# Patient Record
Sex: FEMALE | Race: WHITE | Employment: OTHER | URBAN - METROPOLITAN AREA
[De-identification: names, ages, dates, MRNs, and addresses within clinical notes are randomized per-mention and may not be internally consistent; named-entity substitution may affect disease eponyms.]

---

## 2017-01-11 ENCOUNTER — CARE COORDINATION (OUTPATIENT)
Dept: CASE MANAGEMENT | Age: 72
End: 2017-01-11

## 2017-01-12 ENCOUNTER — HOSPITAL ENCOUNTER (EMERGENCY)
Age: 72
Discharge: HOME | End: 2017-01-12
Payer: MEDICARE

## 2017-01-12 ENCOUNTER — APPOINTMENT (OUTPATIENT)
Dept: CT IMAGING | Age: 72
End: 2017-01-12
Payer: MEDICARE

## 2017-01-12 VITALS
DIASTOLIC BLOOD PRESSURE: 69 MMHG | SYSTOLIC BLOOD PRESSURE: 161 MMHG | BODY MASS INDEX: 29.95 KG/M2 | OXYGEN SATURATION: 98 % | WEIGHT: 180 LBS | TEMPERATURE: 98.5 F | HEART RATE: 66 BPM | RESPIRATION RATE: 15 BRPM

## 2017-01-12 DIAGNOSIS — H10.9 CONJUNCTIVITIS OF BOTH EYES, UNSPECIFIED CONJUNCTIVITIS TYPE: ICD-10-CM

## 2017-01-12 DIAGNOSIS — K52.9 COLITIS: Primary | ICD-10-CM

## 2017-01-12 DIAGNOSIS — K64.9 HEMORRHOIDS, UNSPECIFIED HEMORRHOID TYPE: ICD-10-CM

## 2017-01-12 LAB
ALBUMIN SERPL-MCNC: 3.8 G/DL (ref 3.9–4.9)
ALP BLD-CCNC: 117 U/L (ref 40–130)
ALT SERPL-CCNC: <5 U/L (ref 0–33)
ANION GAP SERPL CALCULATED.3IONS-SCNC: 8 MEQ/L (ref 7–13)
AST SERPL-CCNC: 12 U/L (ref 0–35)
BASOPHILS ABSOLUTE: 0.1 K/UL (ref 0–0.2)
BASOPHILS RELATIVE PERCENT: 0.7 %
BILIRUB SERPL-MCNC: 0.3 MG/DL (ref 0–1.2)
BILIRUBIN URINE: NEGATIVE
BLOOD, URINE: NEGATIVE
BUN BLDV-MCNC: 11 MG/DL (ref 8–23)
CALCIUM SERPL-MCNC: 9 MG/DL (ref 8.6–10.2)
CHLORIDE BLD-SCNC: 99 MEQ/L (ref 98–107)
CLARITY: CLEAR
CO2: 30 MEQ/L (ref 22–29)
COLOR: YELLOW
CREAT SERPL-MCNC: 0.92 MG/DL (ref 0.5–0.9)
EOSINOPHILS ABSOLUTE: 0.3 K/UL (ref 0–0.7)
EOSINOPHILS RELATIVE PERCENT: 2.2 %
GFR AFRICAN AMERICAN: >60
GFR NON-AFRICAN AMERICAN: >60
GLOBULIN: 2.5 G/DL (ref 2.3–3.5)
GLUCOSE BLD-MCNC: 101 MG/DL (ref 74–109)
GLUCOSE URINE: NEGATIVE MG/DL
HCT VFR BLD CALC: 36.7 % (ref 37–47)
HEMOGLOBIN: 12.3 G/DL (ref 12–16)
KETONES, URINE: NEGATIVE MG/DL
LEUKOCYTE ESTERASE, URINE: NEGATIVE
LIPASE: 25 U/L (ref 13–60)
LYMPHOCYTES ABSOLUTE: 1.5 K/UL (ref 1–4.8)
LYMPHOCYTES RELATIVE PERCENT: 11.8 %
MCH RBC QN AUTO: 32.2 PG (ref 27–31.3)
MCHC RBC AUTO-ENTMCNC: 33.5 % (ref 33–37)
MCV RBC AUTO: 96.2 FL (ref 82–100)
MONOCYTES ABSOLUTE: 1 K/UL (ref 0.2–0.8)
MONOCYTES RELATIVE PERCENT: 8.3 %
NEUTROPHILS ABSOLUTE: 9.6 K/UL (ref 1.4–6.5)
NEUTROPHILS RELATIVE PERCENT: 77 %
NITRITE, URINE: NEGATIVE
PDW BLD-RTO: 13.7 % (ref 11.5–14.5)
PH UA: 7 (ref 5–9)
PLATELET # BLD: 333 K/UL (ref 130–400)
POC CREATININE WHOLE BLOOD: 0.9
POTASSIUM SERPL-SCNC: 4.6 MEQ/L (ref 3.5–5.1)
PROTEIN UA: NEGATIVE MG/DL
RBC # BLD: 3.81 M/UL (ref 4.2–5.4)
SODIUM BLD-SCNC: 137 MEQ/L (ref 132–144)
SPECIFIC GRAVITY UA: 1.01 (ref 1–1.03)
TOTAL PROTEIN: 6.3 G/DL (ref 6.4–8.1)
UROBILINOGEN, URINE: 0.2 E.U./DL
WBC # BLD: 12.5 K/UL (ref 4.8–10.8)

## 2017-01-12 PROCEDURE — 99283 EMERGENCY DEPT VISIT LOW MDM: CPT

## 2017-01-12 PROCEDURE — 6360000004 HC RX CONTRAST MEDICATION: Performed by: RADIOLOGY

## 2017-01-12 PROCEDURE — 83690 ASSAY OF LIPASE: CPT

## 2017-01-12 PROCEDURE — 80053 COMPREHEN METABOLIC PANEL: CPT

## 2017-01-12 PROCEDURE — 85025 COMPLETE CBC W/AUTO DIFF WBC: CPT

## 2017-01-12 PROCEDURE — 36415 COLL VENOUS BLD VENIPUNCTURE: CPT

## 2017-01-12 PROCEDURE — 74177 CT ABD & PELVIS W/CONTRAST: CPT

## 2017-01-12 PROCEDURE — 6370000000 HC RX 637 (ALT 250 FOR IP): Performed by: NURSE PRACTITIONER

## 2017-01-12 PROCEDURE — 81003 URINALYSIS AUTO W/O SCOPE: CPT

## 2017-01-12 RX ORDER — IBUPROFEN 800 MG/1
800 TABLET ORAL EVERY 6 HOURS PRN
Qty: 20 TABLET | Refills: 0 | Status: ON HOLD | OUTPATIENT
Start: 2017-01-12 | End: 2018-10-30 | Stop reason: HOSPADM

## 2017-01-12 RX ORDER — ONDANSETRON 2 MG/ML
4 INJECTION INTRAMUSCULAR; INTRAVENOUS ONCE
Status: DISCONTINUED | OUTPATIENT
Start: 2017-01-12 | End: 2017-01-12 | Stop reason: HOSPADM

## 2017-01-12 RX ORDER — 0.9 % SODIUM CHLORIDE 0.9 %
500 INTRAVENOUS SOLUTION INTRAVENOUS ONCE
Status: DISCONTINUED | OUTPATIENT
Start: 2017-01-12 | End: 2017-01-12 | Stop reason: HOSPADM

## 2017-01-12 RX ORDER — TRAMADOL HYDROCHLORIDE 50 MG/1
50 TABLET ORAL EVERY 4 HOURS PRN
Qty: 10 TABLET | Refills: 0 | Status: SHIPPED | OUTPATIENT
Start: 2017-01-12 | End: 2017-01-22

## 2017-01-12 RX ORDER — IBUPROFEN 800 MG/1
800 TABLET ORAL EVERY 6 HOURS PRN
Qty: 20 TABLET | Refills: 0 | Status: SHIPPED | OUTPATIENT
Start: 2017-01-12 | End: 2017-01-12

## 2017-01-12 RX ORDER — CIPROFLOXACIN 500 MG/1
500 TABLET, FILM COATED ORAL ONCE
Status: COMPLETED | OUTPATIENT
Start: 2017-01-12 | End: 2017-01-12

## 2017-01-12 RX ORDER — METRONIDAZOLE 500 MG/1
500 TABLET ORAL ONCE
Status: COMPLETED | OUTPATIENT
Start: 2017-01-12 | End: 2017-01-12

## 2017-01-12 RX ORDER — CIPROFLOXACIN 500 MG/1
500 TABLET, FILM COATED ORAL 2 TIMES DAILY
Qty: 20 TABLET | Refills: 0 | Status: SHIPPED | OUTPATIENT
Start: 2017-01-12 | End: 2017-01-12

## 2017-01-12 RX ORDER — MORPHINE SULFATE 4 MG/ML
4 INJECTION, SOLUTION INTRAMUSCULAR; INTRAVENOUS ONCE
Status: DISCONTINUED | OUTPATIENT
Start: 2017-01-12 | End: 2017-01-12 | Stop reason: HOSPADM

## 2017-01-12 RX ORDER — ERYTHROMYCIN 5 MG/G
OINTMENT OPHTHALMIC ONCE
Qty: 1 TUBE | Refills: 0 | Status: SHIPPED | OUTPATIENT
Start: 2017-01-12 | End: 2017-01-12

## 2017-01-12 RX ORDER — METRONIDAZOLE 500 MG/1
500 TABLET ORAL 3 TIMES DAILY
Qty: 30 TABLET | Refills: 0 | Status: SHIPPED | OUTPATIENT
Start: 2017-01-12 | End: 2017-01-12

## 2017-01-12 RX ORDER — METRONIDAZOLE 500 MG/1
500 TABLET ORAL 3 TIMES DAILY
Qty: 30 TABLET | Refills: 0 | Status: SHIPPED | OUTPATIENT
Start: 2017-01-12 | End: 2017-01-22

## 2017-01-12 RX ORDER — ERYTHROMYCIN 5 MG/G
1 OINTMENT OPHTHALMIC ONCE
Status: COMPLETED | OUTPATIENT
Start: 2017-01-12 | End: 2017-01-12

## 2017-01-12 RX ORDER — ERYTHROMYCIN 5 MG/G
OINTMENT OPHTHALMIC EVERY 6 HOURS
Qty: 1 TUBE | Refills: 0 | Status: SHIPPED | OUTPATIENT
Start: 2017-01-12 | End: 2017-01-12

## 2017-01-12 RX ORDER — CIPROFLOXACIN 500 MG/1
500 TABLET, FILM COATED ORAL 2 TIMES DAILY
Qty: 20 TABLET | Refills: 0 | Status: SHIPPED | OUTPATIENT
Start: 2017-01-12 | End: 2017-01-22

## 2017-01-12 RX ADMIN — CIPROFLOXACIN 500 MG: 500 TABLET, FILM COATED ORAL at 16:57

## 2017-01-12 RX ADMIN — IOPAMIDOL 100 ML: 612 INJECTION, SOLUTION INTRAVENOUS at 13:25

## 2017-01-12 RX ADMIN — ERYTHROMYCIN 1 CM: 5 OINTMENT OPHTHALMIC at 16:58

## 2017-01-12 RX ADMIN — METRONIDAZOLE 500 MG: 500 TABLET ORAL at 16:57

## 2017-01-12 ASSESSMENT — ENCOUNTER SYMPTOMS
ABDOMINAL PAIN: 0
COLOR CHANGE: 0
SHORTNESS OF BREATH: 0
CHEST TIGHTNESS: 0
RECTAL PAIN: 1
ANAL BLEEDING: 0
COUGH: 0
NAUSEA: 0
BACK PAIN: 0
DIARRHEA: 0
VOMITING: 0

## 2017-01-12 ASSESSMENT — PAIN SCALES - GENERAL: PAINLEVEL_OUTOF10: 10

## 2017-01-19 ENCOUNTER — CARE COORDINATION (OUTPATIENT)
Dept: CASE MANAGEMENT | Age: 72
End: 2017-01-19

## 2017-04-07 ENCOUNTER — HOSPITAL ENCOUNTER (EMERGENCY)
Age: 72
Discharge: HOME | End: 2017-04-07
Attending: STUDENT IN AN ORGANIZED HEALTH CARE EDUCATION/TRAINING PROGRAM
Payer: MEDICARE

## 2017-04-07 ENCOUNTER — APPOINTMENT (OUTPATIENT)
Dept: GENERAL RADIOLOGY | Age: 72
End: 2017-04-07
Payer: MEDICARE

## 2017-04-07 VITALS
OXYGEN SATURATION: 100 % | BODY MASS INDEX: 27.32 KG/M2 | RESPIRATION RATE: 20 BRPM | HEIGHT: 66 IN | WEIGHT: 170 LBS | SYSTOLIC BLOOD PRESSURE: 134 MMHG | TEMPERATURE: 97.5 F | DIASTOLIC BLOOD PRESSURE: 76 MMHG | HEART RATE: 66 BPM

## 2017-04-07 DIAGNOSIS — N30.00 ACUTE CYSTITIS WITHOUT HEMATURIA: Primary | ICD-10-CM

## 2017-04-07 LAB
ALBUMIN SERPL-MCNC: 3.6 G/DL (ref 3.9–4.9)
ALP BLD-CCNC: 94 U/L (ref 40–130)
ALT SERPL-CCNC: 6 U/L (ref 0–33)
ANION GAP SERPL CALCULATED.3IONS-SCNC: 7 MEQ/L (ref 7–13)
AST SERPL-CCNC: 11 U/L (ref 0–35)
BACTERIA: ABNORMAL /HPF
BASOPHILS ABSOLUTE: 0.1 K/UL (ref 0–0.2)
BASOPHILS RELATIVE PERCENT: 0.6 %
BILIRUB SERPL-MCNC: 0.2 MG/DL (ref 0–1.2)
BILIRUBIN URINE: NEGATIVE
BLOOD, URINE: ABNORMAL
BUN BLDV-MCNC: 15 MG/DL (ref 8–23)
CALCIUM SERPL-MCNC: 8.9 MG/DL (ref 8.6–10.2)
CHLORIDE BLD-SCNC: 105 MEQ/L (ref 98–107)
CLARITY: ABNORMAL
CO2: 27 MEQ/L (ref 22–29)
COLOR: YELLOW
CREAT SERPL-MCNC: 1.06 MG/DL (ref 0.5–0.9)
EOSINOPHILS ABSOLUTE: 0.1 K/UL (ref 0–0.7)
EOSINOPHILS RELATIVE PERCENT: 0.9 %
GFR AFRICAN AMERICAN: >60
GFR NON-AFRICAN AMERICAN: 51.2
GLOBULIN: 2.3 G/DL (ref 2.3–3.5)
GLUCOSE BLD-MCNC: 119 MG/DL (ref 74–109)
GLUCOSE URINE: NEGATIVE MG/DL
HCT VFR BLD CALC: 35.5 % (ref 37–47)
HEMOGLOBIN: 11.7 G/DL (ref 12–16)
KETONES, URINE: NEGATIVE MG/DL
LEUKOCYTE ESTERASE, URINE: ABNORMAL
LYMPHOCYTES ABSOLUTE: 1.4 K/UL (ref 1–4.8)
LYMPHOCYTES RELATIVE PERCENT: 12.5 %
MCH RBC QN AUTO: 30.1 PG (ref 27–31.3)
MCHC RBC AUTO-ENTMCNC: 32.9 % (ref 33–37)
MCV RBC AUTO: 91.5 FL (ref 82–100)
MONOCYTES ABSOLUTE: 0.5 K/UL (ref 0.2–0.8)
MONOCYTES RELATIVE PERCENT: 4.6 %
NEUTROPHILS ABSOLUTE: 9.3 K/UL (ref 1.4–6.5)
NEUTROPHILS RELATIVE PERCENT: 81.4 %
NITRITE, URINE: NEGATIVE
PDW BLD-RTO: 14.9 % (ref 11.5–14.5)
PH UA: 5.5 (ref 5–9)
PLATELET # BLD: 291 K/UL (ref 130–400)
POTASSIUM SERPL-SCNC: 4.2 MEQ/L (ref 3.5–5.1)
PROTEIN UA: 30 MG/DL
RBC # BLD: 3.88 M/UL (ref 4.2–5.4)
RBC UA: ABNORMAL /HPF (ref 0–2)
SODIUM BLD-SCNC: 139 MEQ/L (ref 132–144)
SPECIFIC GRAVITY UA: 1.02 (ref 1–1.03)
TOTAL PROTEIN: 5.9 G/DL (ref 6.4–8.1)
URINE REFLEX TO CULTURE: YES
UROBILINOGEN, URINE: 0.2 E.U./DL
WBC # BLD: 11.4 K/UL (ref 4.8–10.8)
WBC UA: >100 /HPF (ref 0–5)

## 2017-04-07 PROCEDURE — 81001 URINALYSIS AUTO W/SCOPE: CPT

## 2017-04-07 PROCEDURE — 36415 COLL VENOUS BLD VENIPUNCTURE: CPT

## 2017-04-07 PROCEDURE — 74022 RADEX COMPL AQT ABD SERIES: CPT

## 2017-04-07 PROCEDURE — 85025 COMPLETE CBC W/AUTO DIFF WBC: CPT

## 2017-04-07 PROCEDURE — 87086 URINE CULTURE/COLONY COUNT: CPT

## 2017-04-07 PROCEDURE — 6370000000 HC RX 637 (ALT 250 FOR IP): Performed by: PHYSICIAN ASSISTANT

## 2017-04-07 PROCEDURE — 99284 EMERGENCY DEPT VISIT MOD MDM: CPT

## 2017-04-07 PROCEDURE — 80053 COMPREHEN METABOLIC PANEL: CPT

## 2017-04-07 RX ORDER — CEPHALEXIN 500 MG/1
500 CAPSULE ORAL ONCE
Status: COMPLETED | OUTPATIENT
Start: 2017-04-07 | End: 2017-04-07

## 2017-04-07 RX ORDER — CEPHALEXIN 500 MG/1
500 CAPSULE ORAL 2 TIMES DAILY
Qty: 20 CAPSULE | Refills: 0 | Status: ON HOLD | OUTPATIENT
Start: 2017-04-07 | End: 2018-09-14 | Stop reason: HOSPADM

## 2017-04-07 RX ADMIN — CEPHALEXIN 500 MG: 500 CAPSULE ORAL at 18:03

## 2017-04-07 ASSESSMENT — ENCOUNTER SYMPTOMS
VOMITING: 0
ABDOMINAL PAIN: 0
DIARRHEA: 0
TROUBLE SWALLOWING: 0
COLOR CHANGE: 0
ALLERGIC/IMMUNOLOGIC NEGATIVE: 1
APNEA: 0
EYE PAIN: 0
SHORTNESS OF BREATH: 0

## 2017-04-07 ASSESSMENT — PAIN SCALES - GENERAL: PAINLEVEL_OUTOF10: 10

## 2017-04-07 ASSESSMENT — PAIN DESCRIPTION - LOCATION: LOCATION: ABDOMEN

## 2017-04-09 LAB — URINE CULTURE, ROUTINE: NORMAL

## 2017-10-20 ENCOUNTER — APPOINTMENT (OUTPATIENT)
Dept: GENERAL RADIOLOGY | Age: 72
End: 2017-10-20
Payer: MEDICARE

## 2017-10-20 ENCOUNTER — APPOINTMENT (OUTPATIENT)
Dept: CT IMAGING | Age: 72
End: 2017-10-20
Payer: MEDICARE

## 2017-10-20 ENCOUNTER — HOSPITAL ENCOUNTER (EMERGENCY)
Age: 72
Discharge: HOME | End: 2017-10-20
Payer: MEDICARE

## 2017-10-20 VITALS
HEIGHT: 65 IN | OXYGEN SATURATION: 96 % | BODY MASS INDEX: 36.65 KG/M2 | DIASTOLIC BLOOD PRESSURE: 75 MMHG | TEMPERATURE: 98.4 F | RESPIRATION RATE: 18 BRPM | SYSTOLIC BLOOD PRESSURE: 159 MMHG | WEIGHT: 220 LBS | HEART RATE: 62 BPM

## 2017-10-20 DIAGNOSIS — R07.9 CHEST PAIN, UNSPECIFIED TYPE: Primary | ICD-10-CM

## 2017-10-20 LAB
ALBUMIN SERPL-MCNC: 4 G/DL (ref 3.9–4.9)
ALP BLD-CCNC: 110 U/L (ref 40–130)
ALT SERPL-CCNC: 7 U/L (ref 0–33)
ANION GAP SERPL CALCULATED.3IONS-SCNC: 12 MEQ/L (ref 7–13)
APTT: 25.3 SEC (ref 21.6–35.4)
AST SERPL-CCNC: 14 U/L (ref 0–35)
BILIRUB SERPL-MCNC: 0.4 MG/DL (ref 0–1.2)
BILIRUBIN URINE: ABNORMAL
BLOOD, URINE: NEGATIVE
BUN BLDV-MCNC: 15 MG/DL (ref 8–23)
CALCIUM SERPL-MCNC: 9.2 MG/DL (ref 8.6–10.2)
CHLORIDE BLD-SCNC: 102 MEQ/L (ref 98–107)
CK MB: <0.1 NG/ML (ref 0–3.8)
CLARITY: CLEAR
CO2: 26 MEQ/L (ref 22–29)
COLOR: ABNORMAL
CREAT SERPL-MCNC: 0.73 MG/DL (ref 0.5–0.9)
CREATINE KINASE-MB INDEX: 0.2 % (ref 0–3.5)
EKG ATRIAL RATE: 59 BPM
EKG P AXIS: 72 DEGREES
EKG P-R INTERVAL: 204 MS
EKG Q-T INTERVAL: 480 MS
EKG QRS DURATION: 156 MS
EKG QTC CALCULATION (BAZETT): 475 MS
EKG R AXIS: 34 DEGREES
EKG T AXIS: 230 DEGREES
EKG VENTRICULAR RATE: 59 BPM
GFR AFRICAN AMERICAN: >60
GFR NON-AFRICAN AMERICAN: >60
GLOBULIN: 2.6 G/DL (ref 2.3–3.5)
GLUCOSE BLD-MCNC: 104 MG/DL (ref 74–109)
GLUCOSE URINE: NEGATIVE MG/DL
HCT VFR BLD CALC: 40.8 % (ref 37–47)
HEMOGLOBIN: 13.4 G/DL (ref 12–16)
INR BLD: 1
KETONES, URINE: NEGATIVE MG/DL
LEUKOCYTE ESTERASE, URINE: NEGATIVE
LIPASE: 24 U/L (ref 13–60)
MCH RBC QN AUTO: 31.3 PG (ref 27–31.3)
MCHC RBC AUTO-ENTMCNC: 33 % (ref 33–37)
MCV RBC AUTO: 94.8 FL (ref 82–100)
NITRITE, URINE: NEGATIVE
PDW BLD-RTO: 14.3 % (ref 11.5–14.5)
PH UA: 6 (ref 5–9)
PLATELET # BLD: 253 K/UL (ref 130–400)
POC CREATININE WHOLE BLOOD: 1.1
POTASSIUM SERPL-SCNC: 4.1 MEQ/L (ref 3.5–5.1)
PRO-BNP: 549 PG/ML
PROTEIN UA: NEGATIVE MG/DL
PROTHROMBIN TIME: 10.9 SEC (ref 8.1–13.7)
RBC # BLD: 4.3 M/UL (ref 4.2–5.4)
SODIUM BLD-SCNC: 140 MEQ/L (ref 132–144)
SPECIFIC GRAVITY UA: 1.03 (ref 1–1.03)
TOTAL CK: 51 U/L (ref 0–170)
TOTAL PROTEIN: 6.6 G/DL (ref 6.4–8.1)
TROPONIN: <0.01 NG/ML (ref 0–0.01)
URINE REFLEX TO CULTURE: ABNORMAL
UROBILINOGEN, URINE: 1 E.U./DL
WBC # BLD: 5.4 K/UL (ref 4.8–10.8)

## 2017-10-20 PROCEDURE — 85610 PROTHROMBIN TIME: CPT

## 2017-10-20 PROCEDURE — 83880 ASSAY OF NATRIURETIC PEPTIDE: CPT

## 2017-10-20 PROCEDURE — 85730 THROMBOPLASTIN TIME PARTIAL: CPT

## 2017-10-20 PROCEDURE — 82553 CREATINE MB FRACTION: CPT

## 2017-10-20 PROCEDURE — 36415 COLL VENOUS BLD VENIPUNCTURE: CPT

## 2017-10-20 PROCEDURE — 74177 CT ABD & PELVIS W/CONTRAST: CPT

## 2017-10-20 PROCEDURE — 80053 COMPREHEN METABOLIC PANEL: CPT

## 2017-10-20 PROCEDURE — 83690 ASSAY OF LIPASE: CPT

## 2017-10-20 PROCEDURE — 71010 XR CHEST PORTABLE: CPT

## 2017-10-20 PROCEDURE — 84484 ASSAY OF TROPONIN QUANT: CPT

## 2017-10-20 PROCEDURE — 99285 EMERGENCY DEPT VISIT HI MDM: CPT

## 2017-10-20 PROCEDURE — 6360000004 HC RX CONTRAST MEDICATION: Performed by: RADIOLOGY

## 2017-10-20 PROCEDURE — 82550 ASSAY OF CK (CPK): CPT

## 2017-10-20 PROCEDURE — 85027 COMPLETE CBC AUTOMATED: CPT

## 2017-10-20 PROCEDURE — 93005 ELECTROCARDIOGRAM TRACING: CPT

## 2017-10-20 PROCEDURE — 81003 URINALYSIS AUTO W/O SCOPE: CPT

## 2017-10-20 RX ADMIN — IOPAMIDOL 100 ML: 755 INJECTION, SOLUTION INTRAVENOUS at 15:54

## 2017-10-20 ASSESSMENT — ENCOUNTER SYMPTOMS
ABDOMINAL DISTENTION: 0
RHINORRHEA: 0
CONSTIPATION: 0
COLOR CHANGE: 0
SHORTNESS OF BREATH: 0
ABDOMINAL PAIN: 0
SORE THROAT: 0
EYE DISCHARGE: 0

## 2017-10-20 ASSESSMENT — PAIN DESCRIPTION - ORIENTATION: ORIENTATION: MID

## 2017-10-20 ASSESSMENT — PAIN DESCRIPTION - PAIN TYPE: TYPE: ACUTE PAIN

## 2017-10-20 ASSESSMENT — PAIN SCALES - GENERAL: PAINLEVEL_OUTOF10: 3

## 2017-10-20 ASSESSMENT — PAIN DESCRIPTION - LOCATION: LOCATION: CHEST

## 2017-10-20 NOTE — ED PROVIDER NOTES
[address] [facility] ED  eMERGENCY dEPARTMENT eNCOUnter      Pt Name: Payton Barrientos  MRN: [id number]  [place] 1945  Date of evaluation: 10/20/2017  Provider: Mukesh Engel PA-C    CHIEF COMPLAINT       Chief Complaint   Patient presents with    Chest Pain     mid chest pain & nausea that started 1 hour ago         HISTORY OF PRESENT ILLNESS   (Location/Symptom, Timing/Onset, Context/Setting, Quality, Duration, Modifying Factors, Severity)  Note limiting factors. Payton Barrientos is a 81 y.o. female who presents to the emergency department With a complaint of chest pain. Per family patient states she was complaining of chest pains over the last one hour but during my evaluation I ask her to point to her pains that she points to her abdomen she points to the left lower quadrant of her abdomen across to her pelvic region as her source of pain. Patient does have past history of dementia screenings difficult to get accurate information from her because every time you talk to her her story changes. She denies any cough or fevers no chest pain on examination no shortness of breath. He denies any acute injuries. She denies any constipation or diarrhea and she denies any difficulty with urination. HPI    Nursing Notes were reviewed. REVIEW OF SYSTEMS    (2-9 systems for level 4, 10 or more for level 5)     Review of Systems   Constitutional: Negative for activity change and appetite change. HENT: Negative for congestion, ear discharge, ear pain, nosebleeds, rhinorrhea and sore throat. Eyes: Negative for discharge. Respiratory: Negative for shortness of breath. Cardiovascular: Positive for chest pain. Negative for palpitations and leg swelling. Gastrointestinal: Negative for abdominal distention, abdominal pain and constipation. Genitourinary: Negative for difficulty urinating and dysuria. Musculoskeletal: Negative for arthralgias. Skin: Negative for color change, pallor, rash and wound. Neurological: Negative for dizziness, syncope, numbness and headaches. Psychiatric/Behavioral: Negative for agitation and confusion. Except as noted above the remainder of the review of systems was reviewed and negative.        PAST MEDICAL HISTORY     Past Medical History:   Diagnosis Date    Anxiety     Chronic back pain     Dementia     Depression     GERD (gastroesophageal reflux disease)     Hyperlipidemia     Hypertension     Osteoarthritis          SURGICAL HISTORY       Past Surgical History:   Procedure Laterality Date    COLONOSCOPY N/A 12/10/2016    COLONOSCOPY & BIOPSY performed by Juancarlos Valente MD at [address]      TOTAL KNEE ARTHROPLASTY  2/4/2016         CURRENT MEDICATIONS       Previous Medications    ASPIRIN 81 MG TABLET    Take 81 mg by mouth daily    ATORVASTATIN (LIPITOR) 10 MG TABLET    Take 10 mg by mouth daily    CEPHALEXIN (KEFLEX) 500 MG CAPSULE    Take 1 capsule by mouth 2 times daily    FUROSEMIDE (LASIX) 40 MG TABLET    Take 40 mg by mouth daily    IBUPROFEN (ADVIL;MOTRIN) 800 MG TABLET    Take 1 tablet by mouth every 6 hours as needed for Pain    LORAZEPAM (ATIVAN) 0.5 MG TABLET    Take 1 tablet by mouth 2 times daily as needed for Anxiety    MEMANTINE (NAMENDA XR) 28 MG CP24 EXTENDED RELEASE CAPSULE    Take 1 capsule by mouth daily    METOPROLOL (TOPROL-XL) 50 MG XL TABLET    Take 50 mg by mouth daily    NITROGLYCERIN (NITROSTAT) 0.4 MG SL TABLET    Place 0.4 mg under the tongue every 5 minutes as needed for Chest pain    OMEPRAZOLE (PRILOSEC) 40 MG DELAYED RELEASE CAPSULE    Take 40 mg by mouth daily    POTASSIUM CHLORIDE SA (K-DUR;KLOR-CON M) 10 MEQ TABLET    Take 20 mEq by mouth daily     QUETIAPINE (SEROQUEL) 25 MG TABLET    Take 25 mg by mouth 2 times daily    RANOLAZINE (RANEXA) 500 MG SR TABLET    Take 500 mg by mouth 2 times daily    SERTRALINE (ZOLOFT) 50 MG TABLET    Take 50 mg by mouth daily       ALLERGIES Review of patient's allergies indicates no known allergies. FAMILY HISTORY       Family History   Problem Relation Age of Onset    Hypertension Mother     Cataracts Mother     Hypertension Father           SOCIAL HISTORY       Social History     Social History    Marital status:      Spouse name: N/A    Number of children: N/A    Years of education: N/A     Social History Main Topics    Smoking status: Current Every Day Smoker     Packs/day: 0.25    Smokeless tobacco: Never Used    Alcohol use No    Drug use: No    Sexual activity: Not Currently     Partners: Male     Other Topics Concern    Not on file     Social History Narrative    No narrative on file       SCREENINGS    Tony Coma Scale  Best Verbal Response: Oriented  Best Motor Response: Obeys commands        PHYSICAL EXAM    (up to 7 for level 4, 8 or more for level 5)     ED Triage Vitals [10/20/17 1510]   BP Temp Temp Source Pulse Resp SpO2 Height Weight   (!) 170/71 98.4 °F (36.9 °C) Oral 61 16 97 % 5' 5\" (1.651 m) 220 lb (99.8 kg)       Physical Exam   Constitutional: She is oriented to person, place, and time. She appears well-developed and well-nourished. HENT:   Head: Normocephalic. Eyes: Pupils are equal, round, and reactive to light. Neck: Neck supple. No JVD present. No tracheal deviation present. Cardiovascular: Normal rate. Pulmonary/Chest: Effort normal. No respiratory distress. She has no wheezes. She has no rales. She exhibits no tenderness. Abdominal: Soft. Bowel sounds are normal. She exhibits no distension and no mass. There is no tenderness. There is no rebound and no guarding. Musculoskeletal: She exhibits no edema or deformity. Neurological: She is oriented to person, place, and time.  Coordination normal.       DIAGNOSTIC RESULTS     EKG: All EKG's are interpreted by the Emergency Department Physician who either signs or Co-signs this chart in the absence of a cardiologist.    EKG shows sinus bradycardia at 59 bpm with a left bundle branch block and T-wave inversions in leads 1,2,3 V4 V5 and V6. EKG is similar in comparison to EKG of 5/2/2016    RADIOLOGY:   Non-plain film images such as CT, Ultrasound and MRI are read by the radiologist. Plain radiographic images are visualized and preliminarily interpreted by the emergency physician with the below findings:    Chest x-ray shows no acute pulmonary process. CT abdomen pelvis shows mild colonic diverticulosis some marked degenerative changes with the lumbar spine but otherwise no acute process. Interpretation per the Radiologist below, if available at the time of this note:    XR Chest Portable   Final Result      NO EVIDENCE OF ACTIVE CARDIOPULMONARY DISEASE. CT ABDOMEN PELVIS W IV CONTRAST Additional Contrast? None   Final Result      Mild colonic diverticulosis. Stable renal cysts bilaterally. Hysterectomy. Marked degenerative change lumbar spine with scoliosis. All CT scans at this facility use dose modulation, iterative reconstruction, and/or weight based dosing when appropriate to reduce radiation dose to as low as reasonably achievable. ED BEDSIDE ULTRASOUND:   Performed by ED Physician - none    LABS:  Labs Reviewed   URINE RT REFLEX TO CULTURE - Abnormal; Notable for the following:        Result Value    Color, UA LAKISHA (*)     Bilirubin Urine SMALL (*)     All other components within normal limits   POCT CREATININE - Normal   COMPREHENSIVE METABOLIC PANEL   CBC   TROPONIN   LIPASE   PROTIME-INR   APTT   BRAIN NATRIURETIC PEPTIDE   CK-MB INDEX       All other labs were within normal range or not returned as of this dictation.     EMERGENCY DEPARTMENT COURSE and DIFFERENTIAL DIAGNOSIS/MDM:   Vitals:    Vitals:    10/20/17 1510 10/20/17 1634   BP: (!) 170/71 126/68   Pulse: 61 60   Resp: 16 13   Temp: 98.4 °F (36.9 °C)    TempSrc: Oral    SpO2: 97% 98%   Weight: 220 lb (99.8 kg)    Height: 5' 5\" (1.651 m)          ED Course      MDM  Number of Diagnoses or Management Options  Diagnosis management comments: After completing evaluation of the patient. Cardiac enzymes are negative EKG is unchanged from previous chest x-ray shows no acute pulmonary process CT of the abdomen and pelvis shows no acute abdominal or pelvic process this time. During patient's visit here she had changed her complaints multiple times during her evaluation. At this time it is felt the patient is safe to be discharged home she and her boyfriend were advised that if she has recurrence of her pain she should return to the emergency department immediately. CRITICAL CARE TIME   Total Critical Care time was 0 minutes, excluding separately reportable procedures. There was a high probability of clinically significant/life threatening deterioration in the patient's condition which required my urgent intervention. CONSULTS:  None    PROCEDURES:  Unless otherwise noted below, none     Procedures    FINAL IMPRESSION      1.  Chest pain, unspecified type          DISPOSITION/PLAN   DISPOSITION     PATIENT REFERRED TO:  Mitchell Del Rio DO  [id number]  [phone]    In 2 days      Abril Joyner [facility]   [address] [address]  [phone]    In 2 days        DISCHARGE MEDICATIONS:  New Prescriptions    No medications on file          (Please note that portions of this note were completed with a voice recognition program.  Efforts were made to edit the dictations but occasionally words are mis-transcribed.)    Jose Eduardo Bourgeois PA-C (electronically signed)  Attending Emergency Physician         Jose Eduardo Bourgeois PA-C  10/20/17 0765

## 2017-10-20 NOTE — ED NOTES
Pt resting on cart no complaints at this time. Significant other at bedside , both labs and ct are pending.      Tiffany Sousa RN  10/20/17 0784

## 2017-10-21 LAB
GFR AFRICAN AMERICAN: 59
GFR NON-AFRICAN AMERICAN: 49
PERFORMED ON: ABNORMAL
POC CREATININE: 1.1 MG/DL (ref 0.6–1.2)
POC SAMPLE TYPE: ABNORMAL

## 2017-10-27 PROCEDURE — 93010 ELECTROCARDIOGRAM REPORT: CPT | Performed by: INTERNAL MEDICINE

## 2018-09-12 ENCOUNTER — APPOINTMENT (OUTPATIENT)
Dept: GENERAL RADIOLOGY | Age: 73
DRG: 683 | End: 2018-09-12
Payer: MEDICARE

## 2018-09-12 ENCOUNTER — HOSPITAL ENCOUNTER (INPATIENT)
Age: 73
LOS: 1 days | Discharge: PSYCHIATRIC OR SUBSTANCE USE FACILITY | DRG: 683 | End: 2018-09-14
Attending: EMERGENCY MEDICINE | Admitting: INTERNAL MEDICINE
Payer: MEDICARE

## 2018-09-12 LAB
AMPHETAMINE SCREEN, URINE: NORMAL
ANION GAP SERPL CALCULATED.3IONS-SCNC: 14 MEQ/L (ref 7–13)
BARBITURATE SCREEN URINE: NORMAL
BASOPHILS ABSOLUTE: 0.1 K/UL (ref 0–0.2)
BASOPHILS RELATIVE PERCENT: 0.9 %
BENZODIAZEPINE SCREEN, URINE: NORMAL
BILIRUBIN URINE: ABNORMAL
BLOOD, URINE: NEGATIVE
BUN BLDV-MCNC: 19 MG/DL (ref 8–23)
CALCIUM SERPL-MCNC: 8.9 MG/DL (ref 8.6–10.2)
CANNABINOID SCREEN URINE: NORMAL
CHLORIDE BLD-SCNC: 104 MEQ/L (ref 98–107)
CLARITY: ABNORMAL
CO2: 23 MEQ/L (ref 22–29)
COCAINE METABOLITE SCREEN URINE: NORMAL
COLOR: ABNORMAL
CREAT SERPL-MCNC: 1.28 MG/DL (ref 0.5–0.9)
EKG ATRIAL RATE: 66 BPM
EKG Q-T INTERVAL: 464 MS
EKG QRS DURATION: 150 MS
EKG QTC CALCULATION (BAZETT): 490 MS
EKG R AXIS: 9 DEGREES
EKG T AXIS: 192 DEGREES
EKG VENTRICULAR RATE: 67 BPM
EOSINOPHILS ABSOLUTE: 0.3 K/UL (ref 0–0.7)
EOSINOPHILS RELATIVE PERCENT: 4.3 %
ETHANOL PERCENT: NORMAL G/DL
ETHANOL: <10 MG/DL (ref 0–0.08)
GFR AFRICAN AMERICAN: 49.7
GFR NON-AFRICAN AMERICAN: 41
GLUCOSE BLD-MCNC: 112 MG/DL (ref 74–109)
GLUCOSE URINE: NEGATIVE MG/DL
HCT VFR BLD CALC: 38.7 % (ref 37–47)
HEMOGLOBIN: 13 G/DL (ref 12–16)
KETONES, URINE: NEGATIVE MG/DL
LEUKOCYTE ESTERASE, URINE: ABNORMAL
LYMPHOCYTES ABSOLUTE: 2.1 K/UL (ref 1–4.8)
LYMPHOCYTES RELATIVE PERCENT: 26.4 %
Lab: NORMAL
MCH RBC QN AUTO: 31.4 PG (ref 27–31.3)
MCHC RBC AUTO-ENTMCNC: 33.5 % (ref 33–37)
MCV RBC AUTO: 93.6 FL (ref 82–100)
MONOCYTES ABSOLUTE: 0.6 K/UL (ref 0.2–0.8)
MONOCYTES RELATIVE PERCENT: 7.4 %
NEUTROPHILS ABSOLUTE: 4.9 K/UL (ref 1.4–6.5)
NEUTROPHILS RELATIVE PERCENT: 61 %
NITRITE, URINE: POSITIVE
OPIATE SCREEN URINE: NORMAL
PDW BLD-RTO: 15 % (ref 11.5–14.5)
PH UA: 5.5 (ref 5–9)
PHENCYCLIDINE SCREEN URINE: NORMAL
PLATELET # BLD: 289 K/UL (ref 130–400)
POTASSIUM SERPL-SCNC: 4 MEQ/L (ref 3.5–5.1)
PROTEIN UA: ABNORMAL MG/DL
RBC # BLD: 4.13 M/UL (ref 4.2–5.4)
SODIUM BLD-SCNC: 141 MEQ/L (ref 132–144)
SPECIFIC GRAVITY UA: 1.02 (ref 1–1.03)
TSH SERPL DL<=0.05 MIU/L-ACNC: 2.4 UIU/ML (ref 0.27–4.2)
URINE REFLEX TO CULTURE: YES
UROBILINOGEN, URINE: 1 E.U./DL
WBC # BLD: 8 K/UL (ref 4.8–10.8)

## 2018-09-12 PROCEDURE — 87186 SC STD MICRODIL/AGAR DIL: CPT

## 2018-09-12 PROCEDURE — G0480 DRUG TEST DEF 1-7 CLASSES: HCPCS

## 2018-09-12 PROCEDURE — 36415 COLL VENOUS BLD VENIPUNCTURE: CPT

## 2018-09-12 PROCEDURE — 81001 URINALYSIS AUTO W/SCOPE: CPT

## 2018-09-12 PROCEDURE — 85025 COMPLETE CBC W/AUTO DIFF WBC: CPT

## 2018-09-12 PROCEDURE — 80048 BASIC METABOLIC PNL TOTAL CA: CPT

## 2018-09-12 PROCEDURE — 87077 CULTURE AEROBIC IDENTIFY: CPT

## 2018-09-12 PROCEDURE — 99285 EMERGENCY DEPT VISIT HI MDM: CPT

## 2018-09-12 PROCEDURE — 87086 URINE CULTURE/COLONY COUNT: CPT

## 2018-09-12 PROCEDURE — 93005 ELECTROCARDIOGRAM TRACING: CPT

## 2018-09-12 PROCEDURE — 71045 X-RAY EXAM CHEST 1 VIEW: CPT

## 2018-09-12 PROCEDURE — 84443 ASSAY THYROID STIM HORMONE: CPT

## 2018-09-12 PROCEDURE — 80307 DRUG TEST PRSMV CHEM ANLYZR: CPT

## 2018-09-13 ENCOUNTER — APPOINTMENT (OUTPATIENT)
Dept: CT IMAGING | Age: 73
DRG: 683 | End: 2018-09-13
Payer: MEDICARE

## 2018-09-13 PROBLEM — R41.82 ALTERED MENTAL STATE: Status: ACTIVE | Noted: 2018-09-13

## 2018-09-13 LAB
ALBUMIN SERPL-MCNC: 3.8 G/DL (ref 3.9–4.9)
ALP BLD-CCNC: 113 U/L (ref 40–130)
ALT SERPL-CCNC: 9 U/L (ref 0–33)
AMMONIA: 32 UMOL/L (ref 11–51)
AST SERPL-CCNC: 15 U/L (ref 0–35)
BACTERIA: ABNORMAL /HPF
BILIRUB SERPL-MCNC: <0.2 MG/DL (ref 0–1.2)
BILIRUBIN DIRECT: <0.2 MG/DL (ref 0–0.3)
BILIRUBIN, INDIRECT: ABNORMAL MG/DL (ref 0–0.6)
MUCUS: PRESENT
RBC UA: ABNORMAL /HPF (ref 0–2)
TOTAL PROTEIN: 6.5 G/DL (ref 6.4–8.1)
WBC UA: >100 /HPF (ref 0–5)

## 2018-09-13 PROCEDURE — 2580000003 HC RX 258: Performed by: NURSE PRACTITIONER

## 2018-09-13 PROCEDURE — 96365 THER/PROPH/DIAG IV INF INIT: CPT

## 2018-09-13 PROCEDURE — 93010 ELECTROCARDIOGRAM REPORT: CPT | Performed by: INTERNAL MEDICINE

## 2018-09-13 PROCEDURE — 82140 ASSAY OF AMMONIA: CPT

## 2018-09-13 PROCEDURE — 6360000002 HC RX W HCPCS: Performed by: NURSE PRACTITIONER

## 2018-09-13 PROCEDURE — 1210000000 HC MED SURG R&B

## 2018-09-13 PROCEDURE — 80076 HEPATIC FUNCTION PANEL: CPT

## 2018-09-13 PROCEDURE — 2580000003 HC RX 258: Performed by: INTERNAL MEDICINE

## 2018-09-13 PROCEDURE — 6360000002 HC RX W HCPCS: Performed by: INTERNAL MEDICINE

## 2018-09-13 PROCEDURE — 93005 ELECTROCARDIOGRAM TRACING: CPT

## 2018-09-13 PROCEDURE — 6370000000 HC RX 637 (ALT 250 FOR IP): Performed by: INTERNAL MEDICINE

## 2018-09-13 PROCEDURE — 70450 CT HEAD/BRAIN W/O DYE: CPT

## 2018-09-13 PROCEDURE — 36415 COLL VENOUS BLD VENIPUNCTURE: CPT

## 2018-09-13 RX ORDER — ASPIRIN 81 MG/1
81 TABLET, CHEWABLE ORAL DAILY
Status: DISCONTINUED | OUTPATIENT
Start: 2018-09-13 | End: 2018-09-14 | Stop reason: HOSPADM

## 2018-09-13 RX ORDER — RANOLAZINE 500 MG/1
500 TABLET, EXTENDED RELEASE ORAL 2 TIMES DAILY
Status: DISCONTINUED | OUTPATIENT
Start: 2018-09-13 | End: 2018-09-14 | Stop reason: HOSPADM

## 2018-09-13 RX ORDER — MEMANTINE HYDROCHLORIDE 28 MG/1
28 CAPSULE, EXTENDED RELEASE ORAL DAILY
Status: DISCONTINUED | OUTPATIENT
Start: 2018-09-13 | End: 2018-09-13 | Stop reason: CLARIF

## 2018-09-13 RX ORDER — 0.9 % SODIUM CHLORIDE 0.9 %
1000 INTRAVENOUS SOLUTION INTRAVENOUS ONCE
Status: COMPLETED | OUTPATIENT
Start: 2018-09-13 | End: 2018-09-13

## 2018-09-13 RX ORDER — AMLODIPINE BESYLATE 5 MG/1
5 TABLET ORAL DAILY
Status: DISCONTINUED | OUTPATIENT
Start: 2018-09-13 | End: 2018-09-14

## 2018-09-13 RX ORDER — ATORVASTATIN CALCIUM 10 MG/1
10 TABLET, FILM COATED ORAL DAILY
Status: DISCONTINUED | OUTPATIENT
Start: 2018-09-13 | End: 2018-09-14 | Stop reason: HOSPADM

## 2018-09-13 RX ORDER — SODIUM CHLORIDE 0.9 % (FLUSH) 0.9 %
10 SYRINGE (ML) INJECTION EVERY 12 HOURS SCHEDULED
Status: DISCONTINUED | OUTPATIENT
Start: 2018-09-13 | End: 2018-09-14 | Stop reason: HOSPADM

## 2018-09-13 RX ORDER — SODIUM CHLORIDE 0.9 % (FLUSH) 0.9 %
10 SYRINGE (ML) INJECTION PRN
Status: DISCONTINUED | OUTPATIENT
Start: 2018-09-13 | End: 2018-09-14 | Stop reason: HOSPADM

## 2018-09-13 RX ORDER — METOPROLOL SUCCINATE 50 MG/1
50 TABLET, EXTENDED RELEASE ORAL DAILY
Status: DISCONTINUED | OUTPATIENT
Start: 2018-09-13 | End: 2018-09-14 | Stop reason: HOSPADM

## 2018-09-13 RX ORDER — AMLODIPINE BESYLATE 5 MG/1
5 TABLET ORAL DAILY
Status: ON HOLD | COMMUNITY
End: 2018-09-14

## 2018-09-13 RX ORDER — LANOLIN ALCOHOL/MO/W.PET/CERES
3 CREAM (GRAM) TOPICAL NIGHTLY
COMMUNITY

## 2018-09-13 RX ORDER — MEMANTINE HYDROCHLORIDE 10 MG/1
10 TABLET ORAL 2 TIMES DAILY
Status: DISCONTINUED | OUTPATIENT
Start: 2018-09-13 | End: 2018-09-14 | Stop reason: HOSPADM

## 2018-09-13 RX ORDER — ONDANSETRON 2 MG/ML
4 INJECTION INTRAMUSCULAR; INTRAVENOUS EVERY 6 HOURS PRN
Status: DISCONTINUED | OUTPATIENT
Start: 2018-09-13 | End: 2018-09-14 | Stop reason: HOSPADM

## 2018-09-13 RX ORDER — FUROSEMIDE 40 MG/1
40 TABLET ORAL DAILY
Status: DISCONTINUED | OUTPATIENT
Start: 2018-09-13 | End: 2018-09-14 | Stop reason: HOSPADM

## 2018-09-13 RX ORDER — LANOLIN ALCOHOL/MO/W.PET/CERES
3 CREAM (GRAM) TOPICAL NIGHTLY
Status: DISCONTINUED | OUTPATIENT
Start: 2018-09-13 | End: 2018-09-14 | Stop reason: HOSPADM

## 2018-09-13 RX ORDER — SODIUM CHLORIDE 9 MG/ML
INJECTION, SOLUTION INTRAVENOUS CONTINUOUS
Status: DISCONTINUED | OUTPATIENT
Start: 2018-09-13 | End: 2018-09-14 | Stop reason: HOSPADM

## 2018-09-13 RX ADMIN — AMLODIPINE BESYLATE 5 MG: 5 TABLET ORAL at 09:04

## 2018-09-13 RX ADMIN — MEMANTINE HYDROCHLORIDE 10 MG: 10 TABLET ORAL at 09:04

## 2018-09-13 RX ADMIN — Medication 10 ML: at 09:04

## 2018-09-13 RX ADMIN — ATORVASTATIN CALCIUM 10 MG: 10 TABLET, FILM COATED ORAL at 09:04

## 2018-09-13 RX ADMIN — ASPIRIN 81 MG 81 MG: 81 TABLET ORAL at 09:04

## 2018-09-13 RX ADMIN — SERTRALINE HYDROCHLORIDE 50 MG: 50 TABLET ORAL at 09:04

## 2018-09-13 RX ADMIN — RANOLAZINE 500 MG: 500 TABLET, FILM COATED, EXTENDED RELEASE ORAL at 09:04

## 2018-09-13 RX ADMIN — MELATONIN TAB 3 MG 3 MG: 3 TAB at 20:17

## 2018-09-13 RX ADMIN — SODIUM CHLORIDE 1000 ML: 9 INJECTION, SOLUTION INTRAVENOUS at 02:23

## 2018-09-13 RX ADMIN — RANOLAZINE 500 MG: 500 TABLET, FILM COATED, EXTENDED RELEASE ORAL at 20:17

## 2018-09-13 RX ADMIN — METOPROLOL SUCCINATE 50 MG: 50 TABLET, EXTENDED RELEASE ORAL at 09:04

## 2018-09-13 RX ADMIN — Medication 10 ML: at 20:17

## 2018-09-13 RX ADMIN — FUROSEMIDE 40 MG: 40 TABLET ORAL at 09:04

## 2018-09-13 RX ADMIN — CEFTRIAXONE SODIUM 1 G: 1 INJECTION, POWDER, FOR SOLUTION INTRAMUSCULAR; INTRAVENOUS at 02:22

## 2018-09-13 RX ADMIN — MEMANTINE HYDROCHLORIDE 10 MG: 10 TABLET ORAL at 20:17

## 2018-09-13 RX ADMIN — SODIUM CHLORIDE: 9 INJECTION, SOLUTION INTRAVENOUS at 11:51

## 2018-09-13 RX ADMIN — ENOXAPARIN SODIUM 40 MG: 40 INJECTION SUBCUTANEOUS at 09:03

## 2018-09-13 ASSESSMENT — PAIN SCALES - PAIN ASSESSMENT IN ADVANCED DEMENTIA (PAINAD)
BREATHING: 0
BODYLANGUAGE: 0
BODYLANGUAGE: 0
CONSOLABILITY: 0
CONSOLABILITY: 1
NEGVOCALIZATION: 0
FACIALEXPRESSION: 0
NEGVOCALIZATION: 0
FACIALEXPRESSION: 0
BREATHING: 0
CONSOLABILITY: 1
TOTALSCORE: 0
NEGVOCALIZATION: 0
BODYLANGUAGE: 0
FACIALEXPRESSION: 0
BREATHING: 0
TOTALSCORE: 1
TOTALSCORE: 1

## 2018-09-13 ASSESSMENT — ENCOUNTER SYMPTOMS
BACK PAIN: 0
VOMITING: 0
ABDOMINAL PAIN: 0
RHINORRHEA: 0
ABDOMINAL DISTENTION: 0
COLOR CHANGE: 0
CONSTIPATION: 0
NAUSEA: 0
WHEEZING: 0
SORE THROAT: 0
CHEST TIGHTNESS: 0
TROUBLE SWALLOWING: 0
COUGH: 0
DIARRHEA: 0
SHORTNESS OF BREATH: 0

## 2018-09-13 NOTE — ED TRIAGE NOTES
Per [facility]  called the squad due to patient has been aggressive with him since yesterday. Patient has a hx of dementia. Also Patient seeing thing that aren't and having delusional thinking.   Patient calm and cooperative during assessment

## 2018-09-13 NOTE — H&P
Internal Medicine   History and Physical    Patient's Name/Date of Birth: Yoandy Richards / 1945 (89 y.o.)    Date: September 13, 2018     Chief Complaint: Altered Mental status    HPI:   72years old female with past medical history of dementia, GERD, hypertension, hyperlipidemia, presented to the ED with aggression and ultimately alter mental status. Patient is worse than her baseline. Patient was seen on the floor and she seemed pleasant. However she is confused and does not know where she is. She denied any complaint. She is very poor historian and accurate history is not possible. She denied burning on urination or problem urinating. No abdominal pain. Past Medical History:   Diagnosis Date    Anxiety     Chronic back pain     Dementia     Depression     GERD (gastroesophageal reflux disease)     Hyperlipidemia     Hypertension     Osteoarthritis        Past Surgical History:   Procedure Laterality Date    COLONOSCOPY N/A 12/10/2016    COLONOSCOPY & BIOPSY performed by Elvin Garcia MD at [address] [place] [address]  2/4/2016       Prior to Admission medications    Medication Sig Start Date End Date Taking?  Authorizing Provider   amLODIPine (NORVASC) 5 MG tablet Take 5 mg by mouth daily   Yes Historical Provider, MD   melatonin 3 MG TABS tablet Take 3 mg by mouth nightly   Yes Historical Provider, MD   cephALEXin (KEFLEX) 500 MG capsule Take 1 capsule by mouth 2 times daily 4/7/17  Yes Selena Fernando PA-C   ibuprofen (ADVIL;MOTRIN) 800 MG tablet Take 1 tablet by mouth every 6 hours as needed for Pain 1/12/17  Yes NYDIA Metzger CNP   memantine (NAMENDA XR) 28 MG CP24 extended release capsule Take 1 capsule by mouth daily 12/16/16  Yes March NYDIA Wood CNP   furosemide (LASIX) 40 MG tablet Take 40 mg by mouth daily   Yes Historical Provider, MD   omeprazole (PRILOSEC) 40 MG delayed release capsule Take 40 mg by mouth daily Yes Historical Provider, MD   LORazepam (ATIVAN) 0.5 MG tablet Take 1 tablet by mouth 2 times daily as needed for Anxiety 12/13/16  Yes Bladimir Hawthorne MD   aspirin 81 MG tablet Take 81 mg by mouth daily   Yes Historical Provider, MD   atorvastatin (LIPITOR) 10 MG tablet Take 10 mg by mouth daily   Yes Historical Provider, MD   nitroGLYCERIN (NITROSTAT) 0.4 MG SL tablet Place 0.4 mg under the tongue every 5 minutes as needed for Chest pain   Yes Historical Provider, MD   potassium chloride SA (K-DUR;KLOR-CON M) 10 MEQ tablet Take 20 mEq by mouth daily    Yes Historical Provider, MD   ranolazine (RANEXA) 500 MG SR tablet Take 500 mg by mouth 2 times daily   Yes Historical Provider, MD   QUEtiapine (SEROQUEL) 25 MG tablet Take 25 mg by mouth 3 times daily as needed (behavioral disturbances)  9/13/18  Yes Historical Provider, MD   metoprolol (TOPROL-XL) 50 MG XL tablet Take 50 mg by mouth daily   Yes Historical Provider, MD   sertraline (ZOLOFT) 50 MG tablet Take 50 mg by mouth daily   Yes Historical Provider, MD       No Known Allergies    Family History   Problem Relation Age of Onset    Hypertension Mother     Cataracts Mother     Hypertension Father        Social History     Social History    Marital status:      Spouse name: N/A    Number of children: N/A    Years of education: N/A     Occupational History    Not on file.      Social History Main Topics    Smoking status: Current Every Day Smoker     Packs/day: 0.25    Smokeless tobacco: Never Used    Alcohol use No    Drug use: No    Sexual activity: Not Currently     Partners: Male     Other Topics Concern    Not on file     Social History Narrative    No narrative on file       Review of Systems:   Unable to accurately obtain due to patient being poor historian and confused    Physical Exam:  Vitals:    09/12/18 2240 09/13/18 0228 09/13/18 0527   BP: (!) 141/71 132/68 (!) 150/63   Pulse: 66 72 94   Resp: 20 20 20   Temp: 98.6 °F (37 °C) 97.3 °F (36.3 °C)   TempSrc: Oral  Oral   SpO2: 96% 98% 100%   Weight: 190 lb (86.2 kg)     Height: 5' 4\" (1.626 m)         CONSTITUTIONAL:  awake, alert, cooperative, no apparent distress, and appears stated age  EYES:  Lids and lashes normal, pupils equal   ENT:  Normocephalic, without obvious abnormality   NECK:  Supple, symmetrical, trachea midline   HEMATOLOGIC/LYMPHATICS:  no cervical lymphadenopathy and no supraclavicular lymphadenopathy  BACK:  Symmetric, no curvature   LUNGS:  No increased work of breathing, good air exchange, clear to auscultation bilaterally, no crackles or wheezing  CARDIOVASCULAR:  Normal apical impulse, regular rate and rhythm, normal S1 and S2, no S3 or S4, and no murmur noted  ABDOMEN:  No scars, normal bowel sounds, soft, non-distended, non-tender, no masses palpated, no hepatosplenomegally  CHEST: no masses palpated, no axillary or supraclavicular adenopathy  MUSCULOSKELETAL:  There is no redness, warmth   NEUROLOGIC:  Awake, alert, oriented only to self.  No neurological deficit  SKIN:  no bruising or bleeding, normal skin color    Labs:  Recent Results (from the past 24 hour(s))   EKG 12 Lead    Collection Time: 09/12/18 10:40 PM   Result Value Ref Range    Ventricular Rate 67 BPM    Atrial Rate 66 BPM    QRS Duration 150 ms    Q-T Interval 464 ms    QTc Calculation (Bazett) 490 ms    R Axis 9 degrees    T Axis 192 degrees   CBC Auto Differential    Collection Time: 09/12/18 11:09 PM   Result Value Ref Range    WBC 8.0 4.8 - 10.8 K/uL    RBC 4.13 (L) 4.20 - 5.40 M/uL    Hemoglobin 13.0 12.0 - 16.0 g/dL    Hematocrit 38.7 37.0 - 47.0 %    MCV 93.6 82.0 - 100.0 fL    MCH 31.4 (H) 27.0 - 31.3 pg    MCHC 33.5 33.0 - 37.0 %    RDW 15.0 (H) 11.5 - 14.5 %    Platelets 481 668 - 376 K/uL    Neutrophils % 61.0 %    Lymphocytes % 26.4 %    Monocytes % 7.4 %    Eosinophils % 4.3 %    Basophils % 0.9 %    Neutrophils # 4.9 1.4 - 6.5 K/uL    Lymphocytes # 2.1 1.0 - 4.8 K/uL    Monocytes # 0.6 0.2 - 0.8 K/uL    Eosinophils # 0.3 0.0 - 0.7 K/uL    Basophils # 0.1 0.0 - 0.2 K/uL   Ethanol    Collection Time: 09/12/18 11:10 PM   Result Value Ref Range    Ethanol Lvl <10 mg/dL    Ethanol percent Not indicated G/dL   Basic Metabolic Panel    Collection Time: 09/12/18 11:10 PM   Result Value Ref Range    Sodium 141 132 - 144 mEq/L    Potassium 4.0 3.5 - 5.1 mEq/L    Chloride 104 98 - 107 mEq/L    CO2 23 22 - 29 mEq/L    Anion Gap 14 (H) 7 - 13 mEq/L    Glucose 112 (H) 74 - 109 mg/dL    BUN 19 8 - 23 mg/dL    CREATININE 1.28 (H) 0.50 - 0.90 mg/dL    GFR Non-African American 41.0 (L) >60    GFR  49.7 (L) >60    Calcium 8.9 8.6 - 10.2 mg/dL   TSH without Reflex    Collection Time: 09/12/18 11:10 PM   Result Value Ref Range    TSH 2.400 0.270 - 4.200 uIU/mL   Urine Drug Screen    Collection Time: 09/12/18 11:15 PM   Result Value Ref Range    Amphetamine Screen, Urine Neg Negative <1000 ng/mL    Barbiturate Screen, Ur Neg Negative < 200 ng/mL    Benzodiazepine Screen, Urine Neg Negative < 200 ng/mL    Cannabinoid Scrn, Ur Neg Negative < 50 ng/mL    Cocaine Metabolite Screen, Urine Neg Negative < 300 ng/mL    Opiate Scrn, Ur Neg Negative < 300 ng/mL    PCP Screen, Urine Neg Negative < 25 ng/mL    Drug Screen Comment: see below    Microscopic Urinalysis    Collection Time: 09/12/18 11:15 PM   Result Value Ref Range    Mucus, UA Present     WBC, UA >100 (A) 0 - 5 /HPF    RBC, UA 0-2 0 - 2 /HPF    Bacteria, UA Many /HPF   Urine Reflex to Culture    Collection Time: 09/12/18 11:54 PM   Result Value Ref Range    Color, UA LAKISHA (A) Straw/Yellow    Clarity, UA CLOUDY (A) Clear    Glucose, Ur Negative Negative mg/dL    Bilirubin Urine SMALL (A) Negative    Ketones, Urine Negative Negative mg/dL    Specific Gravity, UA 1.021 1.005 - 1.030    Blood, Urine Negative Negative    pH, UA 5.5 5.0 - 9.0    Protein, UA TRACE (A) Negative mg/dL    Urobilinogen, Urine 1.0 <2.0 E.U./dL    Nitrite, Urine POSITIVE (A) Negative    Leukocyte Esterase, Urine LARGE (A) Negative    Urine Reflex to Culture YES    Hepatic Function Panel    Collection Time: 09/13/18  2:00 AM   Result Value Ref Range    Total Protein 6.5 6.4 - 8.1 g/dL    Alb 3.8 (L) 3.9 - 4.9 g/dL    Alkaline Phosphatase 113 40 - 130 U/L    ALT 9 0 - 33 U/L    AST 15 0 - 35 U/L    Total Bilirubin <0.2 0.0 - 1.2 mg/dL    Bilirubin, Direct <0.2 0.0 - 0.3 mg/dL    Bilirubin, Indirect see below 0.0 - 0.6 mg/dL   Ammonia    Collection Time: 09/13/18  2:00 AM   Result Value Ref Range    Ammonia 32 11 - 51 umol/L     Radiology:  XR CHEST PORTABLE    (Results Pending)   CT Head WO Contrast    (Results Pending)       Assessment/Plan:  1. Altered mental status   Baseline dementia with confusion - unknown of different currently from baseline   Reported to be aggressive in the ER   Patient is pleasantly confused on the floor   She is unable to give history and only oriented to self   No neurological deficits   UA positive for UTIquestionable UTI causing confusion   We'll treat UTI and monitor mentation  2. UTI   UA positive for UTI    Patient is confused and unable to give accurate history to know if she has any complaints   3. Dehydration   Will continue IV hydration   4. AK I   History due to dehydration    Hydrate patient and recheck   5.  Hypertension   Resume home medication    Myriam Ceabllos M.D.  09/13/18

## 2018-09-13 NOTE — ED NOTES
This Nurse was with another patient and patient got up out of bed to use the bathroom and pulled out her IV. Cath intact dressing applied.       Zahraa Bradley RN  09/13/18 [id number]

## 2018-09-13 NOTE — ED NOTES
Patient down and back from CT.   Back to bed comfort measures provided      Teo Garcia RN  09/13/18 0023

## 2018-09-13 NOTE — ED NOTES
Lab work done per protocol updated Kvng Scale PA addition orders were placed      Noreen Ramos [facility]  09/12/18 7033

## 2018-09-13 NOTE — CONSULTS
Inpatient consult to Psychiatry  Consult performed by: Rose Mary Ballard ordered by: Jhon Keane  Reason for consult: evalution for [facility]  Assessment/Recommendations: Department of Psychiatry  Behavioral Health Consult    Reason For Consult:evaluation for [facility]     History obtained from:pt unable to provide Hx / chart reviewed/ collateral provided by caregiver that is male friend that has been taking care of pt for 14 years/   HISTORY OF PRESENT ILLNESS:    The patient is a 72 y.o. female with significant past psychiatric history of Dementia with behavorial disturbance. Pt admitted to [facility] 13 d/t caregiver brought pt to ER/ pt behaviors have been increasingly out of control/ unable to be managed at home. Pt not sleeping for past several days/ increased aggression. Pt visual hallucinations at home. Pt always more confusion in the evenings per caregiver/ but confusion has been all day and all night the past few days/ Pt does have UTI. Pt caregiver unable to manage pt at home. Pt oriented to person only. Pt attempting to climb out of bed. Pt focused on not knowing where her mom is. / per caregiver pt has had no contact with hre mom/brother / son in years. Pt responding to visual hallucinations with nursing RN/ seeing men I room. PSYCHIATRIC HISTORY:      The patient is currently receiving care for the above psychiatric illness. Pt is prescribed namenda zoloft from PCP  Psychiatric Review of Systems         Shelly or Hypomania:  no     Panic Attacks:  no     Phobias:  no     Obsessions and Compulsions:  no     Body or Vocal Tics:  no     Hallucinations: yes visual      Delusions:  no    Substance Abuse History:    Caregiver denies all     Adverse reactions from psychotropic medications:  None noted in chart     Past Psychiatric History:  Prior Diagnosis: Dementia with behavorial disturbance   Psychiatrist: none noted in chart   Therapist:none noted in chart   Hospitalization: unknown   Hx of Suicidal Attempts: no  Hx of violence:  no  ECT: no    Past Medical History:       No date: Anxiety  No date: Chronic back pain  No date: Dementia  No date: Depression  No date: GERD (gastroesophageal reflux disease)  No date: Hyperlipidemia  No date: Hypertension  No date: Osteoarthritis  Past Surgical History:       12/10/2016: COLONOSCOPY N/A      Comment: COLONOSCOPY & BIOPSY performed by Farnaz Palomo MD at [facility]  No date: HYSTERECTOMY  No date: ROTATOR CUFF REPAIR  2/4/2016: TOTAL KNEE ARTHROPLASTY  Medications Prior to Admission:   Prescriptions Prior to Admission: amLODIPine (NORVASC) 5 MG tablet, Take 5 mg by mouth dailymelatonin 3 MG TABS tablet, Take 3 mg by mouth nightlycephALEXin (KEFLEX) 500 MG capsule, Take 1 capsule by mouth 2 times dailyibuprofen (ADVIL;MOTRIN) 800 MG tablet, Take 1 tablet by mouth every 6 hours as needed for Painmemantine (NAMENDA XR) 28 MG CP24 extended release capsule, Take 1 capsule by mouth dailyfurosemide (LASIX) 40 MG tablet, Take 40 mg by mouth dailyomeprazole (PRILOSEC) 40 MG delayed release capsule, Take 40 mg by mouth dailyLORazepam (ATIVAN) 0.5 MG tablet, Take 1 tablet by mouth 2 times daily as needed for Anxietyaspirin 81 MG tablet, Take 81 mg by mouth dailyatorvastatin (LIPITOR) 10 MG tablet, Take 10 mg by mouth dailynitroGLYCERIN (NITROSTAT) 0.4 MG SL tablet, Place 0.4 mg under the tongue every 5 minutes as needed for Chest painpotassium chloride SA (K-DUR;KLOR-CON M) 10 MEQ tablet, Take 20 mEq by mouth daily ranolazine (RANEXA) 500 MG SR tablet, Take 500 mg by mouth 2 times dailyQUEtiapine (SEROQUEL) 25 MG tablet, Take 25 mg by mouth 3 times daily as needed (behavioral disturbances) metoprolol (TOPROL-XL) 50 MG XL tablet, Take 50 mg by mouth dailysertraline (ZOLOFT) 50 MG tablet, Take 50 mg by mouth daily  Allergies:  Patient has no known allergies.     Social History:  Smoking status: Current Every Day Smoker Packs/day: 0.25      Years: 0.00      Smokeless tobacco: Never Used                      Alcohol use: No              Describes Childhood:   Pt unable to describe/ pt kept asking where her mom was   Education: unknown   Employment: Retired   Relationships: pt lives with caregiver male friend 15 years/ in his home   Children: hs at least one son   Current Support: caregiver male friend 15 years   Legal Hx: none  Access to weapons?:  No  Family History  Patient is unable to give family history at this time. REVIEW OF SYSTEMS    Constitutional:  fever   chills   weight loss  weakness  Other:  Eyes:   photophobia   discharge  acuity change    Diplopia    Other:  HENT:   sore throat   ear pain  Tinnitus    Other  Respiratory:   Cough   Shortness of breath    Sputum    Other:   Cardiac: Chest pain   Palpitations Edema  PND   Other:  GI:  Abdominal pain   Nausea  Vomiting  Diarrhea   Other:  :   Dysuria   Frequency  Hematuria  Discharge   Other:  Possible Pregnancy: Yes   No   LMP:   Musculoskeletal:  Back pain  Neck pain  Recent Injury   Skin:  Rash   Itching   Other:  Neurologic:   Headache   Focal weakness   Sensory changes Other:  Endocrine:   Polyuria   Polydipsia   Hair Loss   Other:  Lymphatic:    Swollen glands   Psychiatric:  As per HPI    All other systems negative except as marked or mentioned/indicated in the HPI. Shana Swannop      PHYSICAL EXAM:  Vitals:  BP (!) 179/108   Pulse 60   Temp 97.4 °F (36.3 °C) (Oral)   Resp 16   Ht 5' 4\" (1.626 m)   Wt 190 lb (86.2 kg)   SpO2 100%   BMI 32.61 kg/m²    Neuro Exam:   Muscle Strength & Tone:unable to assess pt laying in bed   Gait: unable to assess pt laying in bed   Involuntary Movements: No    Mental Status Examination:    Level of consciousness:  awake   Appearance:  hospital attire, lying in bed, fair grooming and fair hygiene  Behavior/Motor:  psychomotor agitation  Attitude toward examiner:  Pt difficult to refocus   Speech:  repetitive   Mood: anxious  Affect:  blunted and anxious  Thought processes:  slow   Thought content:  Preoccupied with finding her mom/not knowing where her mom is/   Cognition:  Person only   Concentration poor  Memory impaired immediate recall, recent memory and remote memory  Mini Mental Status not completed   Insight poor   Judgement poor   Fund of Knowledge adequate     DIAGNOSIS:  Dementia with behavorial disturbance           RECOMMENDATIONS    Risk Management:  close watch/ fall risk     Medications:  Begin Zyprexa prn for agitation     Recommend transfer to [facility] geriatric unit or similar for stabilization of symptoms so that pt's caregiver can mange her again in the home/ or seek placement

## 2018-09-13 NOTE — ED NOTES
ekg done and shown to dr. Daniel Cousin placed with labs obtained. Patient tolerated well.  SR up x2, call light in reach      Roberto San RN  09/12/18 1093

## 2018-09-13 NOTE — ED PROVIDER NOTES
[address] [facility] ED  eMERGENCY dEPARTMENT eNCOUnter      Pt Name: Yajaira Lopes  MRN: [id number]  [place] 1945  Date of evaluation: 9/12/2018  Provider: NYDIA Zavala CNP    CHIEF COMPLAINT       Chief Complaint   Patient presents with    Aggressive Behavior     Per [address]  called due to patient has dementia and is having aggressive behavior at home with her . Also patient has been dellusional and having visual hallucinations          HISTORY OF PRESENT ILLNESS   (Location/Symptom, Timing/Onset, Context/Setting, Quality, Duration, Modifying Factors, Severity)  Note limiting factors. Yajaira Lopes is a 72 y.o. female who presents to the emergency department For portable altered mental status hallucinations aggressive agitated behavior. Patient arrived via [address] ambulance from home. EMS report states that patient's  called due to patient's increased aggressiveness behavior and possible hallucinations. Per EMS patient was calm and cooperative during transport and assessment. Patient is unable provide detailed history due to past medical history of dementia with confusion disorientation. Patient denies any current complaints. Patient denies any pain or discomfort or any feeling of illness. Patient does not recall any arguments with her  and states that she is not sure why she has been brought to the ER. Family member not present to provide further information, unable to contact patient's  for further admission. Nursing Notes were reviewed. REVIEW OF SYSTEMS    (2-9 systems for level 4, 10 or more for level 5)     Review of systems information provided by patient and questions calmly and appropriately denies any active complaints or discomfort. Review of Systems   Constitutional: Negative for activity change, appetite change, chills, diaphoresis, fatigue and fever.    HENT: Negative for congestion, ear pain, rhinorrhea, sore throat and trouble swallowing. Eyes: Negative for visual disturbance. Respiratory: Negative for cough, chest tightness, shortness of breath and wheezing. Cardiovascular: Negative for chest pain and palpitations. Gastrointestinal: Negative for abdominal distention, abdominal pain, constipation, diarrhea, nausea and vomiting. Genitourinary: Negative for difficulty urinating, dysuria, flank pain, hematuria, urgency, vaginal bleeding, vaginal discharge and vaginal pain. Musculoskeletal: Negative for arthralgias, back pain, myalgias, neck pain and neck stiffness. Skin: Negative for color change, rash and wound. Neurological: Negative for dizziness and headaches. Except as noted above the remainder of the review of systems was reviewed and negative.        PAST MEDICAL HISTORY     Past Medical History:   Diagnosis Date    Anxiety     Chronic back pain     Dementia     Depression     GERD (gastroesophageal reflux disease)     Hyperlipidemia     Hypertension     Osteoarthritis      Past Surgical History:   Procedure Laterality Date    COLONOSCOPY N/A 12/10/2016    COLONOSCOPY & BIOPSY performed by Tushar Rodgers MD at [address]      TOTAL KNEE ARTHROPLASTY  2/4/2016     Social History     Social History    Marital status:      Spouse name: N/A    Number of children: N/A    Years of education: N/A     Social History Main Topics    Smoking status: Current Every Day Smoker     Packs/day: 0.25    Smokeless tobacco: Never Used    Alcohol use No    Drug use: No    Sexual activity: Not Currently     Partners: Male     Other Topics Concern    None     Social History Narrative    None       SCREENINGS             PHYSICAL EXAM    (up to 7 for level 4, 8 or more for level 5)     ED Triage Vitals [09/12/18 2240]   BP Temp Temp Source Pulse Resp SpO2 Height Weight   (!) 141/71 98.6 °F (37 °C) Oral 66 20 96 % 5' 4\" (1.626 m) 190 lb (86.2 kg) Physical Exam   Constitutional: She appears well-developed and well-nourished. No distress. HENT:   Head: Normocephalic and atraumatic. Right Ear: External ear normal.   Left Ear: External ear normal.   Nose: Nose normal.   Mouth/Throat: Oropharynx is clear and moist. No oropharyngeal exudate. Eyes: Pupils are equal, round, and reactive to light. Conjunctivae and EOM are normal. Right eye exhibits no discharge. Left eye exhibits no discharge. Neck: Normal range of motion. Neck supple. Cardiovascular: Normal rate, regular rhythm and normal heart sounds. Pulmonary/Chest: Effort normal and breath sounds normal. No stridor. Abdominal: Soft. Bowel sounds are normal. She exhibits no distension and no mass. There is no tenderness. There is no rebound and no guarding. Musculoskeletal: Normal range of motion. Lymphadenopathy:     She has no cervical adenopathy. Neurological: She is alert. She has normal strength. She displays no tremor. No cranial nerve deficit or sensory deficit. Coordination normal. GCS eye subscore is 4. GCS verbal subscore is 5. GCS motor subscore is 6. Patient is presently alert and oriented to self and place. Patient is unable to accurately provide the date or events today. Patient has a known history of dementia confusion disorientation, patient is a poor historian. Calm and cooperative with exam follows simple directions well   Skin: Skin is warm and dry. She is not diaphoretic. Psychiatric: She has a normal mood and affect. Her speech is normal and behavior is normal. Judgment and thought content normal. Cognition and memory are impaired. She exhibits abnormal recent memory and abnormal remote memory. Patient is calm and cooperative with exam action questions. Patient demonstrates impaired memory and recall. Patient is not displaying any anxiousness anxiety agitation and aggressiveness patient is noncombative.        DIAGNOSTIC RESULTS     EKG: All EKG's are interpreted by the Emergency Department Physician who either signs or Co-signs this chart in the absence of a cardiologist.    Sinus bradycardia rate of 67 bpm noted wide QRS and no acute ST deviation, EKG is similar appearance to that of October 20, 2017 with no noted acute changes    RADIOLOGY:   Non-plain film images such as CT, Ultrasound and MRI are read by the radiologist. Hany Sanchez radiographic images are visualized and preliminarily interpreted by the emergency physician with the below findings:    Portal chest x-ray clear acute active process similar to person to x-ray from October 20, 2017 no infiltrates no effusions    CT of the head via stat reading radiologist no acute intracranial hemorrhage or cor or ischemia, no skull fracture. Chronic ischemic changes. Persistent mild ventriculomegaly, cannot exclude NPH    Interpretation per the Radiologist below, if available at the time of this note:    XR CHEST PORTABLE    (Results Pending)   CT Head WO Contrast    (Results Pending)         ED BEDSIDE ULTRASOUND:   Performed by ED Physician - none    LABS:  Labs Reviewed   URINE RT REFLEX TO CULTURE - Abnormal; Notable for the following:        Result Value    Color, UA LAKISHA (*)     Clarity, UA CLOUDY (*)     Bilirubin Urine SMALL (*)     Protein, UA TRACE (*)     Nitrite, Urine POSITIVE (*)     Leukocyte Esterase, Urine LARGE (*)     All other components within normal limits   BASIC METABOLIC PANEL - Abnormal; Notable for the following:      Anion Gap 14 (*)     Glucose 112 (*)     CREATININE 1.28 (*)     GFR Non- 41.0 (*)     GFR  49.7 (*)     All other components within normal limits   CBC WITH AUTO DIFFERENTIAL - Abnormal; Notable for the following:     RBC 4.13 (*)     MCH 31.4 (*)     RDW 15.0 (*)     All other components within normal limits   MICROSCOPIC URINALYSIS - Abnormal; Notable for the following:     WBC, UA >100 (*)     All other components within normal limits URINE CULTURE   URINE DRUG SCREEN   ETHANOL   TSH WITHOUT REFLEX   HEPATIC FUNCTION PANEL   AMMONIA       All other labs were within normal range or not returned as of this dictation. EMERGENCY DEPARTMENT COURSE and DIFFERENTIAL DIAGNOSIS/MDM:   Vitals:    Vitals:    09/12/18 2240 09/13/18 0228   BP: (!) 141/71 132/68   Pulse: 66 72   Resp: 20 20   Temp: 98.6 °F (37 °C)    TempSrc: Oral    SpO2: 96% 98%   Weight: 190 lb (86.2 kg)    Height: 5' 4\" (1.626 m)             MDM patient is afebrile nontoxic no acute distress, cooperative with physical exam.  EKG chest x-ray CT of the head as noted above or further evaluation are clear acute process. Lab work is significant for acute changes to renal function along with significant strong urinary tract infection. Patient has a known history of dementia disorientation confusion which is likely being exacerbated by underlying urinary infection process. IV fluids and antibiotics ordered in the ER. Patient continues to state that she has no pain or discomfort during her stay. Due to patient's alteration of mental status report of aggressive behavior and agitation along with urinary tract infection acute kidney injury, plan is to admit patient to hospitalist for further evaluation and treatment. Hospitalist accepted admission of this patient    CRITICAL CARE TIME       CONSULTS:  None    PROCEDURES:  Unless otherwise noted below, none     Procedures    FINAL IMPRESSION      1. Altered mental status, unspecified altered mental status type    2. Acute cystitis without hematuria    3. ROMY (acute kidney injury) [facility])          DISPOSITION/PLAN   DISPOSITION Decision To Admit 09/13/2018 01:57:10 AM      PATIENT REFERRED TO:  No follow-up provider specified.     DISCHARGE MEDICATIONS:  New Prescriptions    No medications on file          (Please note that portions of this note were completed with a voice recognition program.  Efforts were made to edit the dictations but

## 2018-09-13 NOTE — ED NOTES
Patient ambulated to bathroom with this RN with a shuffled gait. Patient unaware of where she is and asking what house her  is in. Urine specimen collected, labeled, and sent to lab. Assisted patient back to room and into bed, comfort measures provided.   SR up x2 and call light in reach       Zahraa Bradley RN  09/12/18 3337

## 2018-09-13 NOTE — PLAN OF CARE
Problem: Falls - Risk of:  Goal: Will remain free from falls  Will remain free from falls    Outcome: Ongoing    Goal: Absence of physical injury  Absence of physical injury    Outcome: Ongoing      Problem: Risk for Impaired Skin Integrity  Goal: Tissue integrity - skin and mucous membranes  Structural intactness and normal physiological function of skin and  mucous membranes.    Outcome: Ongoing      Problem: Confusion - Acute:  Goal: Absence of continued neurological deterioration signs and symptoms  Absence of continued neurological deterioration signs and symptoms   Outcome: Ongoing    Goal: Mental status will be restored to baseline  Mental status will be restored to baseline   Outcome: Ongoing      Problem: Discharge Planning:  Goal: Ability to perform activities of daily living will improve  Ability to perform activities of daily living will improve   Outcome: Ongoing    Goal: Participates in care planning  Participates in care planning   Outcome: Ongoing      Problem: Injury - Risk of, Physical Injury:  Goal: Absence of physical injury  Absence of physical injury    Outcome: Ongoing    Goal: Will remain free from falls  Will remain free from falls    Outcome: Ongoing      Problem: Mood - Altered:  Goal: Mood stable  Mood stable   Outcome: Ongoing    Goal: Absence of abusive behavior  Absence of abusive behavior   Outcome: Ongoing    Goal: Verbalizations of feeling emotionally comfortable while being cared for will increase  Verbalizations of feeling emotionally comfortable while being cared for will increase   Outcome: Ongoing      Problem: Psychomotor Activity - Altered:  Goal: Absence of psychomotor disturbance signs and symptoms  Absence of psychomotor disturbance signs and symptoms   Outcome: Ongoing      Problem: Sensory Perception - Impaired:  Goal: Demonstrations of improved sensory functioning will increase  Demonstrations of improved sensory functioning will increase   Outcome: Ongoing    Goal: Decrease in sensory misperception frequency  Decrease in sensory misperception frequency   Outcome: Ongoing    Goal: Able to refrain from responding to false sensory perceptions  Able to refrain from responding to false sensory perceptions   Outcome: Ongoing    Goal: Demonstrates accurate environmental perceptions  Demonstrates accurate environmental perceptions   Outcome: Ongoing    Goal: Able to distinguish between reality-based and nonreality-based thinking  Able to distinguish between reality-based and nonreality-based thinking   Outcome: Ongoing    Goal: Able to interrupt nonreality-based thinking  Able to interrupt nonreality-based thinking   Outcome: Ongoing      Problem: Sleep Pattern Disturbance:  Goal: Appears well-rested  Appears well-rested   Outcome: Ongoing

## 2018-09-14 VITALS
DIASTOLIC BLOOD PRESSURE: 48 MMHG | WEIGHT: 190 LBS | TEMPERATURE: 97.2 F | RESPIRATION RATE: 16 BRPM | SYSTOLIC BLOOD PRESSURE: 155 MMHG | OXYGEN SATURATION: 97 % | HEART RATE: 59 BPM | HEIGHT: 64 IN | BODY MASS INDEX: 32.44 KG/M2

## 2018-09-14 PROBLEM — N30.00 ACUTE CYSTITIS: Status: ACTIVE | Noted: 2018-09-14

## 2018-09-14 PROBLEM — F03.918 DEMENTIA WITH BEHAVIORAL DISTURBANCE: Status: ACTIVE | Noted: 2018-09-14

## 2018-09-14 LAB
ALBUMIN SERPL-MCNC: 3.5 G/DL (ref 3.9–4.9)
ALP BLD-CCNC: 108 U/L (ref 40–130)
ALT SERPL-CCNC: 7 U/L (ref 0–33)
ANION GAP SERPL CALCULATED.3IONS-SCNC: 12 MEQ/L (ref 7–13)
AST SERPL-CCNC: 12 U/L (ref 0–35)
BASOPHILS ABSOLUTE: 0 K/UL (ref 0–0.2)
BASOPHILS RELATIVE PERCENT: 0.7 %
BILIRUB SERPL-MCNC: 0.3 MG/DL (ref 0–1.2)
BUN BLDV-MCNC: 15 MG/DL (ref 8–23)
CALCIUM SERPL-MCNC: 8.5 MG/DL (ref 8.6–10.2)
CHLORIDE BLD-SCNC: 103 MEQ/L (ref 98–107)
CO2: 24 MEQ/L (ref 22–29)
CREAT SERPL-MCNC: 0.85 MG/DL (ref 0.5–0.9)
EOSINOPHILS ABSOLUTE: 0.3 K/UL (ref 0–0.7)
EOSINOPHILS RELATIVE PERCENT: 4.5 %
GFR AFRICAN AMERICAN: >60
GFR NON-AFRICAN AMERICAN: >60
GLOBULIN: 2.7 G/DL (ref 2.3–3.5)
GLUCOSE BLD-MCNC: 92 MG/DL (ref 74–109)
HCT VFR BLD CALC: 40.6 % (ref 37–47)
HEMOGLOBIN: 13.5 G/DL (ref 12–16)
LYMPHOCYTES ABSOLUTE: 1.7 K/UL (ref 1–4.8)
LYMPHOCYTES RELATIVE PERCENT: 25.1 %
MAGNESIUM: 1.8 MG/DL (ref 1.7–2.3)
MCH RBC QN AUTO: 31 PG (ref 27–31.3)
MCHC RBC AUTO-ENTMCNC: 33.2 % (ref 33–37)
MCV RBC AUTO: 93.2 FL (ref 82–100)
MONOCYTES ABSOLUTE: 0.5 K/UL (ref 0.2–0.8)
MONOCYTES RELATIVE PERCENT: 8 %
NEUTROPHILS ABSOLUTE: 4.1 K/UL (ref 1.4–6.5)
NEUTROPHILS RELATIVE PERCENT: 61.7 %
PDW BLD-RTO: 14.7 % (ref 11.5–14.5)
PLATELET # BLD: 274 K/UL (ref 130–400)
POTASSIUM REFLEX MAGNESIUM: 3.3 MEQ/L (ref 3.5–5.1)
RBC # BLD: 4.35 M/UL (ref 4.2–5.4)
SODIUM BLD-SCNC: 139 MEQ/L (ref 132–144)
TOTAL PROTEIN: 6.2 G/DL (ref 6.4–8.1)
WBC # BLD: 6.7 K/UL (ref 4.8–10.8)

## 2018-09-14 PROCEDURE — 6370000000 HC RX 637 (ALT 250 FOR IP): Performed by: CLINICAL NURSE SPECIALIST

## 2018-09-14 PROCEDURE — 83735 ASSAY OF MAGNESIUM: CPT

## 2018-09-14 PROCEDURE — 2580000003 HC RX 258: Performed by: INTERNAL MEDICINE

## 2018-09-14 PROCEDURE — 99222 1ST HOSP IP/OBS MODERATE 55: CPT | Performed by: CLINICAL NURSE SPECIALIST

## 2018-09-14 PROCEDURE — 85025 COMPLETE CBC W/AUTO DIFF WBC: CPT

## 2018-09-14 PROCEDURE — 6370000000 HC RX 637 (ALT 250 FOR IP): Performed by: INTERNAL MEDICINE

## 2018-09-14 PROCEDURE — 6360000002 HC RX W HCPCS: Performed by: INTERNAL MEDICINE

## 2018-09-14 PROCEDURE — 36415 COLL VENOUS BLD VENIPUNCTURE: CPT

## 2018-09-14 PROCEDURE — 80053 COMPREHEN METABOLIC PANEL: CPT

## 2018-09-14 RX ORDER — AMLODIPINE BESYLATE 5 MG/1
10 TABLET ORAL DAILY
Qty: 30 TABLET | Refills: 3 | Status: ON HOLD | DISCHARGE
Start: 2018-09-14 | End: 2018-10-31 | Stop reason: HOSPADM

## 2018-09-14 RX ORDER — CIPROFLOXACIN 500 MG/1
500 TABLET, FILM COATED ORAL 2 TIMES DAILY
Qty: 10 TABLET | Refills: 0 | DISCHARGE
Start: 2018-09-14 | End: 2018-09-19

## 2018-09-14 RX ORDER — AMLODIPINE BESYLATE 10 MG/1
10 TABLET ORAL DAILY
Status: DISCONTINUED | OUTPATIENT
Start: 2018-09-15 | End: 2018-09-14 | Stop reason: HOSPADM

## 2018-09-14 RX ORDER — POTASSIUM CHLORIDE 20 MEQ/1
40 TABLET, EXTENDED RELEASE ORAL ONCE
Status: COMPLETED | OUTPATIENT
Start: 2018-09-14 | End: 2018-09-14

## 2018-09-14 RX ORDER — OLANZAPINE 5 MG/1
2.5 TABLET, ORALLY DISINTEGRATING ORAL 2 TIMES DAILY PRN
Status: DISCONTINUED | OUTPATIENT
Start: 2018-09-14 | End: 2018-09-14 | Stop reason: HOSPADM

## 2018-09-14 RX ORDER — OLANZAPINE 5 MG/1
2.5 TABLET, ORALLY DISINTEGRATING ORAL 2 TIMES DAILY PRN
Qty: 30 TABLET | Refills: 3 | Status: ON HOLD | DISCHARGE
Start: 2018-09-14 | End: 2018-10-31 | Stop reason: HOSPADM

## 2018-09-14 RX ADMIN — METOPROLOL SUCCINATE 50 MG: 50 TABLET, EXTENDED RELEASE ORAL at 08:23

## 2018-09-14 RX ADMIN — RANOLAZINE 500 MG: 500 TABLET, FILM COATED, EXTENDED RELEASE ORAL at 08:23

## 2018-09-14 RX ADMIN — ASPIRIN 81 MG 81 MG: 81 TABLET ORAL at 08:23

## 2018-09-14 RX ADMIN — OLANZAPINE 2.5 MG: 5 TABLET, ORALLY DISINTEGRATING ORAL at 15:43

## 2018-09-14 RX ADMIN — Medication 10 ML: at 08:22

## 2018-09-14 RX ADMIN — MEMANTINE HYDROCHLORIDE 10 MG: 10 TABLET ORAL at 08:23

## 2018-09-14 RX ADMIN — CEFTRIAXONE SODIUM 1 G: 1 INJECTION, POWDER, FOR SOLUTION INTRAMUSCULAR; INTRAVENOUS at 02:16

## 2018-09-14 RX ADMIN — ENOXAPARIN SODIUM 40 MG: 40 INJECTION SUBCUTANEOUS at 08:22

## 2018-09-14 RX ADMIN — FUROSEMIDE 40 MG: 40 TABLET ORAL at 08:22

## 2018-09-14 RX ADMIN — ATORVASTATIN CALCIUM 10 MG: 10 TABLET, FILM COATED ORAL at 08:22

## 2018-09-14 RX ADMIN — SERTRALINE HYDROCHLORIDE 50 MG: 50 TABLET ORAL at 08:23

## 2018-09-14 RX ADMIN — AMLODIPINE BESYLATE 5 MG: 5 TABLET ORAL at 08:23

## 2018-09-14 RX ADMIN — SODIUM CHLORIDE: 9 INJECTION, SOLUTION INTRAVENOUS at 02:16

## 2018-09-14 RX ADMIN — POTASSIUM CHLORIDE 40 MEQ: 20 TABLET, EXTENDED RELEASE ORAL at 13:07

## 2018-09-14 ASSESSMENT — PAIN SCALES - PAIN ASSESSMENT IN ADVANCED DEMENTIA (PAINAD)
BREATHING: 0
TOTALSCORE: 1
NEGVOCALIZATION: 0
FACIALEXPRESSION: 0
BODYLANGUAGE: 0
CONSOLABILITY: 1

## 2018-09-14 NOTE — CARE COORDINATION
LSW faxed referral to [facility] and spoke with Elaine Aparicio in admissions about pt transfer to them today. She said that she would review the information and get back with LSW regarding an admit to them today.

## 2018-09-14 NOTE — CARE COORDINATION
LSW spoke with Kyrie Rasheed at [facility] and they have accepted the pt for admission today. Ambulance was arranged for today at 7pm.  Significant other was notified.

## 2018-09-14 NOTE — PROGRESS NOTES
Department of Internal Medicine  Progress Note      SUBJECTIVE: AO x0. afberile and HDS. No acute events overnight.        ROS:  All 12 systems reviewed and negative except mentioned in HPI and Assessment and plan    MEDICATIONS:  Current Facility-Administered Medications   Medication Dose Route Frequency Provider Last Rate Last Dose    OLANZapine zydis (ZYPREXA) disintegrating tablet 2.5 mg  2.5 mg Oral BID PRN NYDIA Meneses - CNP        [START ON 9/15/2018] amLODIPine (NORVASC) tablet 10 mg  10 mg Oral Daily Sallie Blanca MD        aspirin chewable tablet 81 mg  81 mg Oral Daily Fina Horner MD   81 mg at 09/14/18 0823    atorvastatin (LIPITOR) tablet 10 mg  10 mg Oral Daily Fina Horner MD   10 mg at 09/14/18 7161    furosemide (LASIX) tablet 40 mg  40 mg Oral Daily Fina Horner MD   40 mg at 09/14/18 0270    melatonin tablet 3 mg  3 mg Oral Nightly Fina Horner MD   3 mg at 09/13/18 2017    metoprolol succinate (TOPROL XL) extended release tablet 50 mg  50 mg Oral Daily Fina Horner MD   50 mg at 09/14/18 7552    ranolazine (RANEXA) extended release tablet 500 mg  500 mg Oral BID Fina Horner MD   500 mg at 09/14/18 9736    sertraline (ZOLOFT) tablet 50 mg  50 mg Oral Daily Fina Horner MD   50 mg at 09/14/18 8615    sodium chloride flush 0.9 % injection 10 mL  10 mL Intravenous 2 times per day Fina Horner MD   10 mL at 09/14/18 2313    sodium chloride flush 0.9 % injection 10 mL  10 mL Intravenous PRN Fina Horner MD        magnesium hydroxide (MILK OF MAGNESIA) 400 MG/5ML suspension 30 mL  30 mL Oral Daily PRN Fina Horner MD        ondansetron [facility]) injection 4 mg  4 mg Intravenous Q6H PRN Fina Horner MD        enoxaparin (LOVENOX) injection 40 mg  40 mg Subcutaneous Daily Fina Horner MD   40 mg at 09/14/18 7276    cefTRIAXone (ROCEPHIN) 1 g IVPB in 50 mL D5W minibag  1 g Intravenous Q24H Fina Horner MD   Stopped at 09/14/18 0250    0.9 % sodium chloride infusion   Intravenous Continuous Sb Paniagua MD 75 mL/hr at 09/14/18 0216      memantine (NAMENDA) tablet 10 mg  10 mg Oral BID Sb Paniagua MD   10 mg at 09/14/18 0494         OBJECTIVE:  Vital Signs:  Vitals:    09/14/18 1241   BP:    Pulse: 59   Resp:    Temp:    SpO2:        General Exam (except as mentioned above):  CONSTITUTIONAL: Awake, alert, no apparent distress  EYES:  PERRL, conjunctiva normal  ENT:  Normocephalic, atraumatic  NECK:  Supple  BACK:  Symmetric  LUNGS:  CTAB except bilateral basilar crackles. CARDIOVASCULAR:  S1S2 present  ABDOMEN:  soft, non-distended, non-tender  MUSCULOSKELETAL:  There is no redness, warmth, or swelling of the joints. NEUROLOGIC:  Alert, awake, oriented x 3. No FND  EXTREMITIES: Warm and well perfused. LABS  Recent Labs      09/12/18   2309  09/14/18   0443   WBC  8.0  6.7   RBC  4.13*  4.35   HGB  13.0  13.5   HCT  38.7  40.6   MCV  93.6  93.2   MCH  31.4*  31.0   MCHC  33.5  33.2   RDW  15.0*  14.7*   PLT  289  274       Recent Labs      09/12/18   2310  09/14/18   0443   NA  141  139   K  4.0  3.3*   CL  104  103   CO2  23  24   BUN  19  15   CREATININE  1.28*  0.85   GLUCOSE  112*  92   CALCIUM  8.9  8.5*       Recent Labs      09/14/18   0443   MG  1.8           ASSESSMENT AND PLAN    Active Hospital Problems    Diagnosis Date Noted    Acute cystitis [N30.00] 09/14/2018    Dementia with behavioral disturbance [F03.91] 09/14/2018    Altered mental state [R41.82] 09/13/2018    Hypertension [I10]     Hyperlipidemia [E78.5]      - Dementia: Started on Zyprexa. For [facility]  GNB UTI: On ceftriaxone.  On cipro at discharge  HTN: Norvasc and Toprol XL  Hyperlipidemia: lipitor    Okay to d/c today if accepted at [facility]    DVT prophylaxis: Lovenox  Juan F Toth MD  Pager : [phone]

## 2018-09-14 NOTE — DISCHARGE INSTR - DIET

## 2018-09-14 NOTE — PLAN OF CARE
Problem: Falls - Risk of:  Goal: Will remain free from falls  Will remain free from falls    Outcome: Ongoing    Goal: Absence of physical injury  Absence of physical injury    Outcome: Ongoing      Problem: Risk for Impaired Skin Integrity  Goal: Tissue integrity - skin and mucous membranes  Structural intactness and normal physiological function of skin and  mucous membranes.    Outcome: Ongoing      Problem: Confusion - Acute:  Goal: Absence of continued neurological deterioration signs and symptoms  Absence of continued neurological deterioration signs and symptoms   Outcome: Ongoing    Goal: Mental status will be restored to baseline  Mental status will be restored to baseline   Outcome: Ongoing      Problem: Injury - Risk of, Physical Injury:  Goal: Absence of physical injury  Absence of physical injury    Outcome: Ongoing    Goal: Will remain free from falls  Will remain free from falls    Outcome: Ongoing      Problem: Psychomotor Activity - Altered:  Goal: Absence of psychomotor disturbance signs and symptoms  Absence of psychomotor disturbance signs and symptoms   Outcome: Ongoing

## 2018-09-14 NOTE — FLOWSHEET NOTE
1600- Patient in bed screaming she wants to go home. Unit director, patients nurse at bedside calming her down. Her boyfriend Irvin Suggs was called. Patient medicated with PRN zyprexa. 1630- Patient resting quietly in bed.

## 2018-09-15 LAB
ORGANISM: ABNORMAL
URINE CULTURE, ROUTINE: ABNORMAL
URINE CULTURE, ROUTINE: ABNORMAL

## 2018-09-15 NOTE — FLOWSHEET NOTE
[phone]- report called to [facility]. 5- Discharged to [facility] with belongings and discharge instructions via stretcher.

## 2018-09-16 NOTE — DISCHARGE SUMMARY
Physician Discharge Summary     Patient ID:  Radha Belle  [id number]  48 y.o.  1945    Admit date: 9/12/2018    Discharge date and time: 9/14/2018  3:02 PM    Admitting Physician: Colletta Pale, MD     Discharge Physician: Sasha Garcia MD    Admission Diagnoses: Altered mental state [R41.82]    Discharge Diagnoses: Active Hospital Problems    Diagnosis Date Noted    Acute cystitis [N30.00] 09/14/2018    Dementia with behavioral disturbance [F03.91] 09/14/2018    Altered mental state [R41.82] 09/13/2018    Hypertension [I10]     Hyperlipidemia [E78.5]        Admission Condition: fair    Discharged Condition: good    Indication for Admission: [address] Course: Patient was admitted and treated for above conditions. The patient was discharged in stable condition    Inpatient meds:    Labs:  LABS  Recent Labs      09/14/18   0443   WBC  6.7   RBC  4.35   HGB  13.5   HCT  40.6   MCV  93.2   MCH  31.0   MCHC  33.2   RDW  14.7*   PLT  274       Recent Labs      09/14/18   0443   NA  139   K  3.3*   CL  103   CO2  24   BUN  15   CREATININE  0.85   GLUCOSE  92   CALCIUM  8.5*       Recent Labs      09/14/18   0443   MG  1.8         Imaging: Ct Head Wo Contrast    Result Date: 9/13/2018  CT HEAD WO CONTRAST CLINICAL HISTORY: Change in mental status COMPARISON: May 1, 2016 TECHNIQUE: Multiple unenhanced serial axial images of the brain from the vertex of the skull to the base of the skull were performed. FINDINGS: The ventricles are dilated. This is compensatory to the surrounding moderate generalized parenchymal volume loss. Unchanged in size configuration. No mass. No midline shift. The cisterns are patent. There are white matter and periventricular changes most likely consistent with chronic small vessel disease. No acute intra-axial or extra-axial findings. The visualized osseous structures are unremarkable. The visualized portion of the paranasal sinuses, and mastoids are unremarkable.      NO ACUTE INTRA-AXIAL OR EXTRA-AXIAL FINDINGS. All CT scans at this facility use dose modulation, iterative reconstruction, and/or weight based dosing when appropriate to reduce radiation dose to as low as reasonably achievable. Xr Chest Portable    Result Date: 9/13/2018  EXAMINATION: CHEST PORTABLE VIEW  CLINICAL HISTORY: Change in mental status COMPARISONS: February 20, 2017  FINDINGS: Single  views of the chest is submitted. The cardiac silhouette is of normal size configuration. The mediastinum is unremarkable. Pulmonary vascular unremarkable. Right sided trachea. No focal infiltrates. No Pneumothoraces.                                                                                    NO ACUTE ACTIVE CARDIOPULMONARY PROCESS        Discharge Exam:  BP (!) 155/48   Pulse 59   Temp 97.2 °F (36.2 °C) (Oral)   Resp 16   Ht 5' 4\" (1.626 m)   Wt 190 lb (86.2 kg)   SpO2 97%   BMI 32.61 kg/m²     General Appearance:    Alert, cooperative, no distress, appears stated age   Head:    Normocephalic, without obvious abnormality, atraumatic   Eyes:    PERRL, conjunctiva/corneas clear, EOM's intact, fundi     benign, both eyes   Ears:    Normal TM's and external ear canals, both ears   Nose:   Nares normal, septum midline, mucosa normal, no drainage    or sinus tenderness   Throat:   Lips, mucosa, and tongue normal; teeth and gums normal   Neck:   Supple, symmetrical, trachea midline, no adenopathy;     thyroid:  no enlargement/tenderness/nodules; no carotid    bruit or JVD   Back:     Symmetric, no curvature, ROM normal, no CVA tenderness   Lungs:     Clear to auscultation bilaterally, respirations unlabored   Chest Wall:    No tenderness or deformity    Heart:    Regular rate and rhythm, S1 and S2 normal, no murmur, rub   or gallop   Breast Exam:    No tenderness, masses, or nipple abnormality   Abdomen:     Soft, non-tender, bowel sounds active all four quadrants,     no masses, no organomegaly   Genitalia:    Normal female without lesion, discharge or tenderness   Rectal:    Normal tone ;guaiac negative stool   Extremities:   Extremities normal, atraumatic, no cyanosis or edema   Pulses:   2+ and symmetric all extremities   Skin:   Skin color, texture, turgor normal, no rashes or lesions   Lymph nodes:   Cervical, supraclavicular, and axillary nodes normal   Neurologic:   CNII-XII intact, normal strength, sensation and reflexes     throughout       In process/preliminary results:  Outstanding Order Results     No orders found from 8/14/2018 to 9/13/2018.           Discharge medications     Medication List      START taking these medications    ciprofloxacin 500 MG tablet  Commonly known as:  CIPRO  Take 1 tablet by mouth 2 times daily for 5 days     OLANZapine zydis 5 MG disintegrating tablet  Commonly known as:  ZYPREXA  Take 0.5 tablets by mouth 2 times daily as needed (agitation)        CHANGE how you take these medications    amLODIPine 5 MG tablet  Commonly known as:  NORVASC  Take 2 tablets by mouth daily  What changed:  how much to take        CONTINUE taking these medications    aspirin 81 MG tablet     atorvastatin 10 MG tablet  Commonly known as:  LIPITOR     furosemide 40 MG tablet  Commonly known as:  LASIX     ibuprofen 800 MG tablet  Commonly known as:  ADVIL;MOTRIN  Take 1 tablet by mouth every 6 hours as needed for Pain     melatonin 3 MG Tabs tablet     memantine ER 28 MG Cp24 extended release capsule  Commonly known as:  NAMENDA XR  Take 1 capsule by mouth daily     metoprolol succinate 50 MG extended release tablet  Commonly known as:  TOPROL XL     nitroGLYCERIN 0.4 MG SL tablet  Commonly known as:  NITROSTAT     omeprazole 40 MG delayed release capsule  Commonly known as:  PRILOSEC     potassium chloride 10 MEQ extended release tablet  Commonly known as:  KLOR-CON M     ranolazine 500 MG extended release tablet  Commonly known as:  RANEXA     sertraline 50 MG tablet  Commonly known as:  ZOLOFT        STOP taking these medications    cephALEXin 500 MG capsule  Commonly known as:  KEFLEX     LORazepam 0.5 MG tablet  Commonly known as:  ATIVAN     QUEtiapine 25 MG tablet  Commonly known as:  SEROQUEL           Where to Get Your Medications      Information about where to get these medications is not yet available    Ask your nurse or doctor about these medications  · amLODIPine 5 MG tablet  · ciprofloxacin 500 MG tablet  · OLANZapine zydis 5 MG disintegrating tablet           Patient Instructions:   Discharge Medication List as of 9/14/2018  4:46 PM      START taking these medications    Details   ciprofloxacin (CIPRO) 500 MG tablet Take 1 tablet by mouth 2 times daily for 5 days, Disp-10 tablet, R-0DC to SNF      OLANZapine zydis (ZYPREXA) 5 MG disintegrating tablet Take 0.5 tablets by mouth 2 times daily as needed (agitation), Disp-30 tablet, R-3DC to SNF         CONTINUE these medications which have CHANGED    Details   amLODIPine (NORVASC) 5 MG tablet Take 2 tablets by mouth daily, Disp-30 tablet, R-3DC to SNF         CONTINUE these medications which have NOT CHANGED    Details   melatonin 3 MG TABS tablet Take 3 mg by mouth nightlyHistorical Med      ibuprofen (ADVIL;MOTRIN) 800 MG tablet Take 1 tablet by mouth every 6 hours as needed for Pain, Disp-20 tablet, R-0      memantine (NAMENDA XR) 28 MG CP24 extended release capsule Take 1 capsule by mouth daily, Disp-30 capsule, R-0      furosemide (LASIX) 40 MG tablet Take 40 mg by mouth daily      omeprazole (PRILOSEC) 40 MG delayed release capsule Take 40 mg by mouth daily      aspirin 81 MG tablet Take 81 mg by mouth daily      atorvastatin (LIPITOR) 10 MG tablet Take 10 mg by mouth daily      nitroGLYCERIN (NITROSTAT) 0.4 MG SL tablet Place 0.4 mg under the tongue every 5 minutes as needed for Chest pain      potassium chloride SA (K-DUR;KLOR-CON M) 10 MEQ tablet Take 20 mEq by mouth daily       ranolazine (RANEXA) 500 MG SR tablet Take 500 mg by mouth 2 times daily      metoprolol (TOPROL-XL) 50 MG XL tablet Take 50 mg by mouth daily      sertraline (ZOLOFT) 50 MG tablet Take 50 mg by mouth daily         STOP taking these medications       cephALEXin (KEFLEX) 500 MG capsule Comments:   Reason for Stopping:         LORazepam (ATIVAN) 0.5 MG tablet Comments:   Reason for Stopping:         QUEtiapine (SEROQUEL) 25 MG tablet Comments:   Reason for Stopping:             Activity: activity as tolerated  Diet: regular diet and diabetic diet ( if indicated)    I spent more than 30 minutes talking to the patient, family and the nurse and preparing the discharge      Signed:  Daryle Nick, MD  Pager : [phone]  9/16/2018  3:02 PM

## 2018-09-24 ENCOUNTER — APPOINTMENT (OUTPATIENT)
Dept: CT IMAGING | Age: 73
End: 2018-09-24
Payer: COMMERCIAL

## 2018-09-24 ENCOUNTER — HOSPITAL ENCOUNTER (EMERGENCY)
Age: 73
Discharge: HOME | End: 2018-09-24
Attending: EMERGENCY MEDICINE
Payer: COMMERCIAL

## 2018-09-24 VITALS
TEMPERATURE: 97.9 F | DIASTOLIC BLOOD PRESSURE: 82 MMHG | RESPIRATION RATE: 16 BRPM | WEIGHT: 190 LBS | OXYGEN SATURATION: 100 % | SYSTOLIC BLOOD PRESSURE: 132 MMHG | BODY MASS INDEX: 32.61 KG/M2 | HEART RATE: 86 BPM

## 2018-09-24 DIAGNOSIS — S09.90XA INJURY OF HEAD, INITIAL ENCOUNTER: ICD-10-CM

## 2018-09-24 DIAGNOSIS — S01.01XA LACERATION OF SCALP, INITIAL ENCOUNTER: Primary | ICD-10-CM
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
## 2018-09-24 PROCEDURE — 90715 TDAP VACCINE 7 YRS/> IM: CPT | Performed by: EMERGENCY MEDICINE

## 2018-09-24 PROCEDURE — 99283 EMERGENCY DEPT VISIT LOW MDM: CPT

## 2018-09-24 PROCEDURE — 90471 IMMUNIZATION ADMIN: CPT | Performed by: EMERGENCY MEDICINE

## 2018-09-24 PROCEDURE — 72125 CT NECK SPINE W/O DYE: CPT

## 2018-09-24 PROCEDURE — 6360000002 HC RX W HCPCS: Performed by: EMERGENCY MEDICINE

## 2018-09-24 PROCEDURE — 6370000000 HC RX 637 (ALT 250 FOR IP): Performed by: EMERGENCY MEDICINE

## 2018-09-24 PROCEDURE — 2500000003 HC RX 250 WO HCPCS: Performed by: EMERGENCY MEDICINE

## 2018-09-24 PROCEDURE — 12002 RPR S/N/AX/GEN/TRNK2.6-7.5CM: CPT

## 2018-09-24 PROCEDURE — 70450 CT HEAD/BRAIN W/O DYE: CPT

## 2018-09-24 RX ORDER — TRAZODONE HYDROCHLORIDE 50 MG/1
50 TABLET ORAL NIGHTLY PRN
Status: ON HOLD | COMMUNITY
End: 2018-10-27

## 2018-09-24 RX ORDER — NALOXONE HYDROCHLORIDE 0.4 MG/ML
0.4 INJECTION, SOLUTION INTRAMUSCULAR; INTRAVENOUS; SUBCUTANEOUS PRN
Status: ON HOLD | COMMUNITY
End: 2018-10-27

## 2018-09-24 RX ORDER — DONEPEZIL HYDROCHLORIDE 5 MG/1
5 TABLET, FILM COATED ORAL 2 TIMES DAILY
COMMUNITY

## 2018-09-24 RX ORDER — GINSENG 100 MG
CAPSULE ORAL ONCE
Status: COMPLETED | OUTPATIENT
Start: 2018-09-24 | End: 2018-09-24

## 2018-09-24 RX ORDER — LIDOCAINE HYDROCHLORIDE AND EPINEPHRINE 10; 10 MG/ML; UG/ML
20 INJECTION, SOLUTION INFILTRATION; PERINEURAL ONCE
Status: COMPLETED | OUTPATIENT
Start: 2018-09-24 | End: 2018-09-24

## 2018-09-24 RX ORDER — MEMANTINE HYDROCHLORIDE 5 MG/1
10 TABLET ORAL 2 TIMES DAILY
Status: ON HOLD | COMMUNITY
End: 2018-10-27

## 2018-09-24 RX ORDER — DIVALPROEX SODIUM 125 MG/1
125 CAPSULE, COATED PELLETS ORAL 3 TIMES DAILY
COMMUNITY

## 2018-09-24 RX ADMIN — LIDOCAINE HYDROCHLORIDE,EPINEPHRINE BITARTRATE 20 ML: 10; .01 INJECTION, SOLUTION INFILTRATION; PERINEURAL at 13:35

## 2018-09-24 RX ADMIN — TETANUS TOXOID, REDUCED DIPHTHERIA TOXOID AND ACELLULAR PERTUSSIS VACCINE, ADSORBED 0.5 ML: 5; 2.5; 8; 8; 2.5 SUSPENSION INTRAMUSCULAR at 11:00

## 2018-09-24 RX ADMIN — Medication 3 ML: at 11:02

## 2018-09-24 RX ADMIN — BACITRACIN: 500 OINTMENT TOPICAL at 14:31

## 2018-09-24 ASSESSMENT — PAIN DESCRIPTION - LOCATION: LOCATION: HEAD

## 2018-09-24 ASSESSMENT — PAIN DESCRIPTION - ORIENTATION: ORIENTATION: OTHER (COMMENT)

## 2018-09-24 ASSESSMENT — PAIN SCALES - GENERAL
PAINLEVEL_OUTOF10: 5
PAINLEVEL_OUTOF10: 5

## 2018-09-24 NOTE — ED NOTES
Bed: 21  Expected date: 9/24/18  Expected time: 9:59 AM  Means of arrival: [facility]  Comments:  73 [facility] [facility]. [place] back and hit her head on her walker. Has laceration. No loc. On 81 mg baby asa daily. Alert to self only is her norm.    jack Arroyo RN  44/26/34 7961

## 2018-09-24 NOTE — ED NOTES
Cleaned patient of small soft formed BM. New diaper applied. Pants pulled back up.       Mao Newman RN  09/24/18 9840

## 2018-09-24 NOTE — ED PROVIDER NOTES
Arm, Right (5b. ): No drift  Motor Leg, Left (6a. ): No drift  Motor Leg, Right (6b. ): No drift  Limb Ataxia (7. ): Absent  Sensory (8. ): Normal  Best Language (9. ): No aphasia  Dysarthria (10. ): Normal  Extinction and Inattention (11): No neglectGlasgow Coma Scale  Eye Opening: Spontaneous  Best Verbal Response: Confused  Best Motor Response: Obeys commands  [place] Coma Scale Score: 14        PHYSICAL EXAM    (up to 7 for level 4, 8 or more for level 5)     ED Triage Vitals [09/24/18 1019]   BP Temp Temp Source Pulse Resp SpO2 Height Weight   (!) 157/77 97.9 °F (36.6 °C) Oral 73 18 100 % -- 190 lb (86.2 kg)       Physical Exam   Constitutional: She is oriented to person, place, and time. She appears well-developed and well-nourished. HENT:   Head: Normocephalic. Patient has a V-shaped laceration on the crown of her head which is greeted. There is no disruption of the fascial layer next to the scalp. There is no foreign body. It is gaping. Eyes: Pupils are equal, round, and reactive to light. Conjunctivae and EOM are normal.   Neck: Normal range of motion. Neck supple. Cardiovascular: Normal rate. Pulmonary/Chest: Effort normal.   Abdominal: Soft. Bowel sounds are normal.   Musculoskeletal: Normal range of motion. Neurological: She is alert and oriented to person, place, and time. She has normal reflexes. Skin: Skin is warm and dry. Psychiatric: She has a normal mood and affect.        DIAGNOSTIC RESULTS     EKG: All EKG's are interpreted by the Emergency Department Physician who either signs or Co-signs this chart in the absence of a cardiologist.        RADIOLOGY:   Non-plain film images such as CT, Ultrasound and MRI are read by the radiologist. Plain radiographic images are visualized and preliminarily interpreted by the emergency physician with the below findings:        Interpretation per the Radiologist below, if available at the time of this note:    [address] PM  Performed by: Magdiel Marcus by: Ehsan Carr     Consent:     Consent obtained:  Emergent situation    Consent given by:  Healthcare agent    Risks discussed:  Infection, pain, poor cosmetic result and poor wound healing    Alternatives discussed:  No treatment  Anesthesia (see MAR for exact dosages): Anesthesia method:  Topical application and local infiltration    Topical anesthetic:  LET    Local anesthetic:  Lidocaine 1% WITH epi  Laceration details:     Location:  Scalp    Scalp location:  Crown    Length (cm):  6    Depth (mm):  2  Repair type:     Repair type:  Complex  Pre-procedure details:     Preparation:  Patient was prepped and draped in usual sterile fashion  Exploration:     Limited defect created (wound extended): no      Hemostasis achieved with:  LET and direct pressure    Wound exploration: entire depth of wound probed and visualized      Wound extent: no fascia violation noted and no foreign bodies/material noted      Contaminated: no    Treatment:     Area cleansed with:  Saline    Amount of cleaning:  Standard    Irrigation solution:  Sterile saline    Irrigation volume:  30cc    Irrigation method:  Syringe    Visualized foreign bodies/material removed: no      Debridement:  None    Undermining:  None    Scar revision: no    Subcutaneous repair:     Suture size:  3-0    Suture material:  Vicryl    Suture technique:  Figure eight    Number of sutures:  1  Skin repair:     Repair method:  Staples    Number of staples:  5  Approximation:     Approximation:  Close    Vermilion border: well-aligned    Post-procedure details:     Dressing:  Antibiotic ointment    Patient tolerance of procedure: Tolerated well, no immediate complications        FINAL IMPRESSION      1. Laceration of scalp, initial encounter    2. Injury of head, initial encounter    3.  Fall, initial encounter          DISPOSITION/PLAN   DISPOSITION Decision To Discharge 09/24/2018 01:51:15 PM      PATIENT REFERRED TO:  Vic Ray DO  [address] [place] [address]  [phone]      in five days for staple removal      DISCHARGE MEDICATIONS:  New Prescriptions    No medications on file          (Please note that portions of this note were completed with a voice recognition program.  Efforts were made to edit the dictations but occasionally words are mis-transcribed.)    Yudelka Trujillo DO (electronically signed)  Attending Emergency Physician          Yudelka Trujillo DO  09/24/18 9969

## 2018-09-24 NOTE — ED NOTES
Emergency contact contacted. No answer. Left message. NH already had called per NH.      Thais Cloud RN  09/24/18 6015 [place] Mishra RN  09/24/18 2500

## 2018-09-27 ENCOUNTER — HOSPITAL ENCOUNTER (OUTPATIENT)
Dept: CT IMAGING | Age: 73
Discharge: HOME | End: 2018-09-29
Payer: COMMERCIAL

## 2018-09-27 ENCOUNTER — HOSPITAL ENCOUNTER (OUTPATIENT)
Dept: CT IMAGING | Age: 73
End: 2018-09-27
Payer: COMMERCIAL

## 2018-09-27 DIAGNOSIS — R93.7 ABNORMAL BONE XRAY: ICD-10-CM

## 2018-09-27 PROCEDURE — 73700 CT LOWER EXTREMITY W/O DYE: CPT

## 2018-10-27 ENCOUNTER — HOSPITAL ENCOUNTER (INPATIENT)
Age: 73
LOS: 4 days | Discharge: NURSING, REHAB OR LONG-TERM CARE | DRG: 470 | End: 2018-10-31
Attending: ORTHOPAEDIC SURGERY | Admitting: ORTHOPAEDIC SURGERY
Payer: MEDICARE

## 2018-10-27 ENCOUNTER — APPOINTMENT (OUTPATIENT)
Dept: GENERAL RADIOLOGY | Age: 73
DRG: 470 | End: 2018-10-27
Payer: MEDICARE

## 2018-10-27 DIAGNOSIS — I10 ESSENTIAL HYPERTENSION: ICD-10-CM

## 2018-10-27 DIAGNOSIS — S72.001A CLOSED RIGHT HIP FRACTURE, INITIAL ENCOUNTER (HCC): ICD-10-CM
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
## 2018-10-27 DIAGNOSIS — S72.001A CLOSED FRACTURE OF RIGHT HIP, INITIAL ENCOUNTER (HCC): Primary | ICD-10-CM

## 2018-10-27 LAB
ABO/RH: NORMAL
ALBUMIN SERPL-MCNC: 2.9 G/DL (ref 3.9–4.9)
ALP BLD-CCNC: 79 U/L (ref 40–130)
ALT SERPL-CCNC: 7 U/L (ref 0–33)
ANION GAP SERPL CALCULATED.3IONS-SCNC: 14 MEQ/L (ref 7–13)
ANTIBODY SCREEN: NORMAL
APTT: 25.4 SEC (ref 21.6–35.4)
APTT: 27.9 SEC (ref 21.6–35.4)
AST SERPL-CCNC: 19 U/L (ref 0–35)
BACTERIA: ABNORMAL /HPF
BANDED NEUTROPHILS RELATIVE PERCENT: 1 % (ref 5–11)
BASOPHILS ABSOLUTE: 0 K/UL (ref 0–0.2)
BASOPHILS ABSOLUTE: 0.1 K/UL (ref 0–0.2)
BASOPHILS RELATIVE PERCENT: 0.4 %
BASOPHILS RELATIVE PERCENT: 1 %
BILIRUB SERPL-MCNC: 0.3 MG/DL (ref 0–1.2)
BILIRUBIN URINE: NEGATIVE
BLOOD, URINE: NEGATIVE
BUN BLDV-MCNC: 9 MG/DL (ref 8–23)
CALCIUM SERPL-MCNC: 8.6 MG/DL (ref 8.6–10.2)
CHLORIDE BLD-SCNC: 104 MEQ/L (ref 98–107)
CLARITY: CLEAR
CO2: 26 MEQ/L (ref 22–29)
COLOR: ABNORMAL
CREAT SERPL-MCNC: 0.56 MG/DL (ref 0.5–0.9)
EKG ATRIAL RATE: 64 BPM
EKG P-R INTERVAL: 168 MS
EKG Q-T INTERVAL: 502 MS
EKG QRS DURATION: 144 MS
EKG QTC CALCULATION (BAZETT): 517 MS
EKG R AXIS: 6 DEGREES
EKG T AXIS: 104 DEGREES
EKG VENTRICULAR RATE: 64 BPM
EOSINOPHILS ABSOLUTE: 0 K/UL (ref 0–0.7)
EOSINOPHILS ABSOLUTE: 0 K/UL (ref 0–0.7)
EOSINOPHILS RELATIVE PERCENT: 0.3 %
EOSINOPHILS RELATIVE PERCENT: 1.3 %
EPITHELIAL CELLS, UA: ABNORMAL /HPF
GFR AFRICAN AMERICAN: >60
GFR NON-AFRICAN AMERICAN: >60
GLOBULIN: 3.2 G/DL (ref 2.3–3.5)
GLUCOSE BLD-MCNC: 103 MG/DL (ref 74–109)
GLUCOSE URINE: NEGATIVE MG/DL
HCT VFR BLD CALC: 35.6 % (ref 37–47)
HCT VFR BLD CALC: 36 % (ref 37–47)
HEMOGLOBIN: 12 G/DL (ref 12–16)
HEMOGLOBIN: 12 G/DL (ref 12–16)
INR BLD: 1.1
INR BLD: 1.2
KETONES, URINE: ABNORMAL MG/DL
LEUKOCYTE ESTERASE, URINE: ABNORMAL
LYMPHOCYTES ABSOLUTE: 1.1 K/UL (ref 1–4.8)
LYMPHOCYTES ABSOLUTE: 1.6 K/UL (ref 1–4.8)
LYMPHOCYTES RELATIVE PERCENT: 11.5 %
LYMPHOCYTES RELATIVE PERCENT: 13 %
MCH RBC QN AUTO: 31.5 PG (ref 27–31.3)
MCH RBC QN AUTO: 32 PG (ref 27–31.3)
MCHC RBC AUTO-ENTMCNC: 33.4 % (ref 33–37)
MCHC RBC AUTO-ENTMCNC: 33.6 % (ref 33–37)
MCV RBC AUTO: 94.3 FL (ref 82–100)
MCV RBC AUTO: 95.1 FL (ref 82–100)
METAMYELOCYTES RELATIVE PERCENT: 1 %
MONOCYTES ABSOLUTE: 0.3 K/UL (ref 0.2–0.8)
MONOCYTES ABSOLUTE: 0.7 K/UL (ref 0.2–0.8)
MONOCYTES RELATIVE PERCENT: 2.8 %
MONOCYTES RELATIVE PERCENT: 5 %
MUCUS: PRESENT
MYELOCYTE PERCENT: 2 %
NEUTROPHILS ABSOLUTE: 11.7 K/UL (ref 1.4–6.5)
NEUTROPHILS ABSOLUTE: 7.4 K/UL (ref 1.4–6.5)
NEUTROPHILS RELATIVE PERCENT: 81 %
NEUTROPHILS RELATIVE PERCENT: 82.8 %
NITRITE, URINE: NEGATIVE
PDW BLD-RTO: 16.8 % (ref 11.5–14.5)
PDW BLD-RTO: 17 % (ref 11.5–14.5)
PH UA: 7.5 (ref 5–9)
PLATELET # BLD: 298 K/UL (ref 130–400)
PLATELET # BLD: 299 K/UL (ref 130–400)
PLATELET SLIDE REVIEW: NORMAL
POTASSIUM SERPL-SCNC: 3.5 MEQ/L (ref 3.5–5.1)
PROTEIN UA: ABNORMAL MG/DL
PROTHROMBIN TIME: 11.4 SEC (ref 9.6–12.3)
PROTHROMBIN TIME: 12.8 SEC (ref 9.6–12.3)
RBC # BLD: 3.74 M/UL (ref 4.2–5.4)
RBC # BLD: 3.82 M/UL (ref 4.2–5.4)
RBC UA: ABNORMAL /HPF (ref 0–2)
SODIUM BLD-SCNC: 144 MEQ/L (ref 132–144)
SPECIFIC GRAVITY UA: 1.02 (ref 1–1.03)
TOTAL PROTEIN: 6.1 G/DL (ref 6.4–8.1)
UROBILINOGEN, URINE: 1 E.U./DL
WBC # BLD: 14.1 K/UL (ref 4.8–10.8)
WBC # BLD: 8.7 K/UL (ref 4.8–10.8)
WBC UA: ABNORMAL /HPF (ref 0–5)

## 2018-10-27 PROCEDURE — 87077 CULTURE AEROBIC IDENTIFY: CPT

## 2018-10-27 PROCEDURE — 86901 BLOOD TYPING SEROLOGIC RH(D): CPT

## 2018-10-27 PROCEDURE — 36415 COLL VENOUS BLD VENIPUNCTURE: CPT

## 2018-10-27 PROCEDURE — 85610 PROTHROMBIN TIME: CPT

## 2018-10-27 PROCEDURE — 80053 COMPREHEN METABOLIC PANEL: CPT

## 2018-10-27 PROCEDURE — 6370000000 HC RX 637 (ALT 250 FOR IP): Performed by: ORTHOPAEDIC SURGERY

## 2018-10-27 PROCEDURE — 85730 THROMBOPLASTIN TIME PARTIAL: CPT

## 2018-10-27 PROCEDURE — 85025 COMPLETE CBC W/AUTO DIFF WBC: CPT

## 2018-10-27 PROCEDURE — 6360000002 HC RX W HCPCS: Performed by: PHYSICIAN ASSISTANT

## 2018-10-27 PROCEDURE — 86850 RBC ANTIBODY SCREEN: CPT

## 2018-10-27 PROCEDURE — 99285 EMERGENCY DEPT VISIT HI MDM: CPT

## 2018-10-27 PROCEDURE — 86923 COMPATIBILITY TEST ELECTRIC: CPT

## 2018-10-27 PROCEDURE — 71045 X-RAY EXAM CHEST 1 VIEW: CPT

## 2018-10-27 PROCEDURE — 87086 URINE CULTURE/COLONY COUNT: CPT

## 2018-10-27 PROCEDURE — 93005 ELECTROCARDIOGRAM TRACING: CPT

## 2018-10-27 PROCEDURE — 81001 URINALYSIS AUTO W/SCOPE: CPT

## 2018-10-27 PROCEDURE — 96375 TX/PRO/DX INJ NEW DRUG ADDON: CPT

## 2018-10-27 PROCEDURE — 86900 BLOOD TYPING SEROLOGIC ABO: CPT

## 2018-10-27 PROCEDURE — 1210000000 HC MED SURG R&B

## 2018-10-27 PROCEDURE — 96374 THER/PROPH/DIAG INJ IV PUSH: CPT

## 2018-10-27 PROCEDURE — 2580000003 HC RX 258: Performed by: ORTHOPAEDIC SURGERY

## 2018-10-27 PROCEDURE — 6370000000 HC RX 637 (ALT 250 FOR IP): Performed by: NURSE PRACTITIONER

## 2018-10-27 PROCEDURE — 73502 X-RAY EXAM HIP UNI 2-3 VIEWS: CPT

## 2018-10-27 PROCEDURE — 73552 X-RAY EXAM OF FEMUR 2/>: CPT

## 2018-10-27 PROCEDURE — 6360000002 HC RX W HCPCS: Performed by: ORTHOPAEDIC SURGERY

## 2018-10-27 PROCEDURE — 87186 SC STD MICRODIL/AGAR DIL: CPT

## 2018-10-27 RX ORDER — OXYCODONE HYDROCHLORIDE AND ACETAMINOPHEN 5; 325 MG/1; MG/1
1 TABLET ORAL EVERY 4 HOURS PRN
Status: DISCONTINUED | OUTPATIENT
Start: 2018-10-27 | End: 2018-10-29

## 2018-10-27 RX ORDER — CEFAZOLIN SODIUM 2 G/50ML
2 SOLUTION INTRAVENOUS
Status: DISCONTINUED | OUTPATIENT
Start: 2018-10-27 | End: 2018-10-28

## 2018-10-27 RX ORDER — MORPHINE SULFATE 4 MG/ML
4 INJECTION, SOLUTION INTRAMUSCULAR; INTRAVENOUS
Status: DISCONTINUED | OUTPATIENT
Start: 2018-10-27 | End: 2018-10-28 | Stop reason: SDUPTHER

## 2018-10-27 RX ORDER — ONDANSETRON 2 MG/ML
4 INJECTION INTRAMUSCULAR; INTRAVENOUS ONCE
Status: COMPLETED | OUTPATIENT
Start: 2018-10-27 | End: 2018-10-27

## 2018-10-27 RX ORDER — METOPROLOL SUCCINATE 50 MG/1
50 TABLET, EXTENDED RELEASE ORAL DAILY
Status: DISCONTINUED | OUTPATIENT
Start: 2018-10-28 | End: 2018-10-31

## 2018-10-27 RX ORDER — SODIUM CHLORIDE 0.9 % (FLUSH) 0.9 %
10 SYRINGE (ML) INJECTION EVERY 12 HOURS SCHEDULED
Status: DISCONTINUED | OUTPATIENT
Start: 2018-10-27 | End: 2018-10-28

## 2018-10-27 RX ORDER — AMLODIPINE BESYLATE 10 MG/1
10 TABLET ORAL DAILY
Status: DISCONTINUED | OUTPATIENT
Start: 2018-10-27 | End: 2018-10-30

## 2018-10-27 RX ORDER — SODIUM CHLORIDE 9 MG/ML
INJECTION, SOLUTION INTRAVENOUS CONTINUOUS
Status: DISCONTINUED | OUTPATIENT
Start: 2018-10-27 | End: 2018-10-29

## 2018-10-27 RX ORDER — MEMANTINE HYDROCHLORIDE 28 MG/1
28 CAPSULE, EXTENDED RELEASE ORAL DAILY
Status: DISCONTINUED | OUTPATIENT
Start: 2018-10-27 | End: 2018-10-27 | Stop reason: CLARIF

## 2018-10-27 RX ORDER — DONEPEZIL HYDROCHLORIDE 10 MG/1
5 TABLET, FILM COATED ORAL 2 TIMES DAILY
Status: DISCONTINUED | OUTPATIENT
Start: 2018-10-27 | End: 2018-10-31 | Stop reason: HOSPADM

## 2018-10-27 RX ORDER — MORPHINE SULFATE 2 MG/ML
2 INJECTION, SOLUTION INTRAMUSCULAR; INTRAVENOUS
Status: DISCONTINUED | OUTPATIENT
Start: 2018-10-27 | End: 2018-10-28 | Stop reason: SDUPTHER

## 2018-10-27 RX ORDER — MEMANTINE HYDROCHLORIDE 10 MG/1
10 TABLET ORAL 2 TIMES DAILY
Status: DISCONTINUED | OUTPATIENT
Start: 2018-10-27 | End: 2018-10-31 | Stop reason: HOSPADM

## 2018-10-27 RX ORDER — MORPHINE SULFATE 4 MG/ML
4 INJECTION, SOLUTION INTRAMUSCULAR; INTRAVENOUS ONCE
Status: COMPLETED | OUTPATIENT
Start: 2018-10-27 | End: 2018-10-27

## 2018-10-27 RX ORDER — OXYCODONE HYDROCHLORIDE AND ACETAMINOPHEN 5; 325 MG/1; MG/1
2 TABLET ORAL EVERY 4 HOURS PRN
Status: DISCONTINUED | OUTPATIENT
Start: 2018-10-27 | End: 2018-10-29

## 2018-10-27 RX ORDER — OMEPRAZOLE 20 MG/1
40 CAPSULE, DELAYED RELEASE ORAL EVERY MORNING
Status: DISCONTINUED | OUTPATIENT
Start: 2018-10-28 | End: 2018-10-27 | Stop reason: CLARIF

## 2018-10-27 RX ORDER — SODIUM CHLORIDE 0.9 % (FLUSH) 0.9 %
10 SYRINGE (ML) INJECTION PRN
Status: DISCONTINUED | OUTPATIENT
Start: 2018-10-27 | End: 2018-10-28

## 2018-10-27 RX ORDER — DIVALPROEX SODIUM 125 MG/1
125 CAPSULE, COATED PELLETS ORAL 3 TIMES DAILY
Status: DISCONTINUED | OUTPATIENT
Start: 2018-10-27 | End: 2018-10-31 | Stop reason: HOSPADM

## 2018-10-27 RX ORDER — ONDANSETRON 2 MG/ML
4 INJECTION INTRAMUSCULAR; INTRAVENOUS EVERY 6 HOURS PRN
Status: DISCONTINUED | OUTPATIENT
Start: 2018-10-27 | End: 2018-10-28 | Stop reason: SDUPTHER

## 2018-10-27 RX ORDER — ATORVASTATIN CALCIUM 10 MG/1
10 TABLET, FILM COATED ORAL DAILY
Status: DISCONTINUED | OUTPATIENT
Start: 2018-10-27 | End: 2018-10-31 | Stop reason: HOSPADM

## 2018-10-27 RX ORDER — PANTOPRAZOLE SODIUM 40 MG/1
40 TABLET, DELAYED RELEASE ORAL
Status: DISCONTINUED | OUTPATIENT
Start: 2018-10-28 | End: 2018-10-31 | Stop reason: HOSPADM

## 2018-10-27 RX ORDER — LANOLIN ALCOHOL/MO/W.PET/CERES
3 CREAM (GRAM) TOPICAL NIGHTLY
Status: DISCONTINUED | OUTPATIENT
Start: 2018-10-27 | End: 2018-10-31 | Stop reason: HOSPADM

## 2018-10-27 RX ORDER — RANOLAZINE 500 MG/1
500 TABLET, EXTENDED RELEASE ORAL 2 TIMES DAILY
Status: DISCONTINUED | OUTPATIENT
Start: 2018-10-27 | End: 2018-10-31 | Stop reason: HOSPADM

## 2018-10-27 RX ADMIN — RANOLAZINE 500 MG: 500 TABLET, FILM COATED, EXTENDED RELEASE ORAL at 21:51

## 2018-10-27 RX ADMIN — MEMANTINE 10 MG: 10 TABLET ORAL at 21:51

## 2018-10-27 RX ADMIN — MORPHINE SULFATE 4 MG: 4 INJECTION, SOLUTION INTRAMUSCULAR; INTRAVENOUS at 16:59

## 2018-10-27 RX ADMIN — OXYCODONE AND ACETAMINOPHEN 1 TABLET: 5; 325 TABLET ORAL at 21:52

## 2018-10-27 RX ADMIN — SODIUM CHLORIDE: 9 INJECTION, SOLUTION INTRAVENOUS at 17:00

## 2018-10-27 RX ADMIN — ONDANSETRON 4 MG: 2 INJECTION INTRAMUSCULAR; INTRAVENOUS at 13:52

## 2018-10-27 RX ADMIN — MORPHINE SULFATE 4 MG: 4 INJECTION, SOLUTION INTRAMUSCULAR; INTRAVENOUS at 13:53

## 2018-10-27 RX ADMIN — DIVALPROEX SODIUM 125 MG: 125 CAPSULE, COATED PELLETS ORAL at 21:51

## 2018-10-27 RX ADMIN — MELATONIN TAB 3 MG 3 MG: 3 TAB at 21:51

## 2018-10-27 RX ADMIN — DONEPEZIL HYDROCHLORIDE 5 MG: 10 TABLET, FILM COATED ORAL at 21:51

## 2018-10-27 ASSESSMENT — PAIN DESCRIPTION - DESCRIPTORS
DESCRIPTORS: PATIENT UNABLE TO DESCRIBE
DESCRIPTORS: PATIENT UNABLE TO DESCRIBE

## 2018-10-27 ASSESSMENT — PAIN SCALES - GENERAL
PAINLEVEL_OUTOF10: 7
PAINLEVEL_OUTOF10: 10
PAINLEVEL_OUTOF10: 10
PAINLEVEL_OUTOF10: 7
PAINLEVEL_OUTOF10: 7

## 2018-10-27 ASSESSMENT — PAIN DESCRIPTION - ORIENTATION
ORIENTATION: RIGHT;UPPER
ORIENTATION: RIGHT

## 2018-10-27 ASSESSMENT — PAIN DESCRIPTION - FREQUENCY
FREQUENCY: CONTINUOUS
FREQUENCY: CONTINUOUS

## 2018-10-27 ASSESSMENT — PAIN DESCRIPTION - PAIN TYPE
TYPE: ACUTE PAIN
TYPE: ACUTE PAIN

## 2018-10-27 ASSESSMENT — PAIN DESCRIPTION - ONSET
ONSET: SUDDEN
ONSET: SUDDEN

## 2018-10-27 ASSESSMENT — PAIN DESCRIPTION - LOCATION
LOCATION: LEG
LOCATION: LEG

## 2018-10-27 NOTE — ED PROVIDER NOTES
tablet Take 50 mg by mouth daily      ibuprofen (ADVIL;MOTRIN) 800 MG tablet Take 1 tablet by mouth every 6 hours as needed for Pain  Qty: 20 tablet, Refills: 0      nitroGLYCERIN (NITROSTAT) 0.4 MG SL tablet Place 0.4 mg under the tongue every 5 minutes as needed for Chest pain             ALLERGIES     Patient has no known allergies. FAMILY HISTORY       Family History   Problem Relation Age of Onset    Hypertension Mother     Cataracts Mother     Hypertension Father           SOCIAL HISTORY       Social History     Social History    Marital status:      Spouse name: N/A    Number of children: N/A    Years of education: N/A     Social History Main Topics    Smoking status: Former Smoker     Packs/day: 0.25    Smokeless tobacco: Never Used    Alcohol use No    Drug use: No    Sexual activity: Not Currently     Partners: Male     Other Topics Concern    None     Social History Narrative    None       SCREENINGS    [place] Coma Scale  Eye Opening: Spontaneous  Best Verbal Response: Confused  Best Motor Response: Obeys commands  [place] Coma Scale Score: 14 @FLOW([id number])@      PHYSICAL EXAM    (up to 7 for level 4, 8 or more for level 5)     ED Triage Vitals [10/27/18 1326]   BP Temp Temp Source Pulse Resp SpO2 Height Weight   129/67 97.5 °F (36.4 °C) Oral 107 -- 96 % -- 190 lb (86.2 kg)       Physical Exam   Constitutional: She is oriented to person, place, and time. She appears well-developed and well-nourished. HENT:   Head: Normocephalic. Eyes: Pupils are equal, round, and reactive to light. Neck: Normal range of motion. Neck supple. No JVD present. No tracheal deviation present. No pain on palpation of crepitus with range of motion without increased pain. Cardiovascular: Normal rate. Pulmonary/Chest: Effort normal and breath sounds normal. No respiratory distress. She has no wheezes. She has no rales. She exhibits no tenderness. Abdominal: Soft.  Bowel sounds are normal. Gap 14 (*)     Total Protein 6.1 (*)     Alb 2.9 (*)     All other components within normal limits   CBC WITH AUTO DIFFERENTIAL - Abnormal; Notable for the following:     RBC 3.82 (*)     Hematocrit 36.0 (*)     MCH 31.5 (*)     RDW 17.0 (*)     Neutrophils # 7.4 (*)     Bands Relative 1 (*)     All other components within normal limits   CBC WITH AUTO DIFFERENTIAL - Abnormal; Notable for the following:     WBC 14.1 (*)     RBC 3.74 (*)     Hematocrit 35.6 (*)     MCH 32.0 (*)     RDW 16.8 (*)     Neutrophils # 11.7 (*)     All other components within normal limits    Narrative:     Collection has been rescheduled by Ora Pham at 10/27/2018 16:40. Reason:   Failed attempt at venipuncture   PROTIME-INR - Abnormal; Notable for the following:     Protime 12.8 (*)     All other components within normal limits    Narrative:     Collection has been rescheduled by Ora Pham at 10/27/2018 16:40. Reason:   Failed attempt at venipuncture   URINALYSIS - Abnormal; Notable for the following:     Color, UA LAKISHA (*)     Ketones, Urine TRACE (*)     Protein, UA TRACE (*)     Leukocyte Esterase, Urine SMALL (*)     All other components within normal limits   MICROSCOPIC URINALYSIS - Abnormal; Notable for the following:     WBC, UA 6-10 (*)     All other components within normal limits   URINE CULTURE   PROTIME-INR   APTT   APTT    Narrative:     Collection has been rescheduled by Ora Pham at 10/27/2018 16:40. Reason:   Failed attempt at venipuncture   TYPE AND SCREEN   PREPARE RBC (CROSSMATCH)   TYPE AND SCREEN       All other labs were within normal range or not returned as of this dictation.     EMERGENCY DEPARTMENT COURSE and DIFFERENTIAL DIAGNOSIS/MDM:   Vitals:    Vitals:    10/27/18 1539 10/27/18 1615 10/27/18 1645 10/28/18 0120   BP: (!) 142/65 119/72  (!) 135/49   Pulse: 67 66  63   Resp: 16 18 19   Temp:  98.2 °F (36.8 °C)  97.9 °F (36.6 °C)   TempSrc:  Oral  Oral   SpO2: 98% 100%  100%   Weight:   169 lb 12.1 oz (77 kg)    Height: 5' 6\" (1.676 m)             MDM  Number of Diagnoses or Management Options  Closed fracture of right hip, initial encounter [facility]):   Fall, initial encounter:   Diagnosis management comments: After evaluation of x-rays patient has what appears as a femoral neck fracture to her right hip I did discuss this with the patient herself but she does have dementia and I'm not clear that she understands what I am explained to her. I did contact family who lives with patient's, Marianne Deborah he will come to the emergency room to be with the patient I did ask him to bring along with him her vital of all her current medications as it is unclear what she takes. We will be admitted to the hospital under orthopedic services for surgical repair right hip. I did speak with Dr. Carlotta Murphy of orthopedics he will accept admission this patient's for surgical repair, with consult to Hospitalist for medical management. [facility] CRITICAL CARE TIME   Total Critical Care time was 0 minutes, excluding separately reportableprocedures. There was a high probability of clinicallysignificant/life threatening deterioration in the patient's condition which required my urgent intervention. CONSULTS:  IP CONSULT TO ORTHOPEDIC SURGERY  IP CONSULT TO HOSPITALIST  IP CONSULT TO CARDIOLOGY  IP CONSULT TO HOSPITALIST  IP CONSULT TO CASE MANAGEMENT  IP CONSULT TO SOCIAL WORK  IP CONSULT TO SOCIAL WORK    PROCEDURES:  Unless otherwise noted below, none     Procedures    FINAL IMPRESSION      1. Closed fracture of right hip, initial encounter ([facility] [address]    2.  Fall, initial encounter          DISPOSITION/PLAN   DISPOSITION Admitted 10/27/2018 03:02:06 PM      PATIENT REFERRED TO:  Mark Henry DO  [address] [place] [address] [phone]            DISCHARGE MEDICATIONS:  Current Discharge Medication List             (Please note that portions of this note were completed with a voice recognition program.  Efforts were made to edit the dictations but occasionally words are mis-transcribed.)    Juan Pablo Prakash PA-C (electronically signed)  Attending Emergency Physician         Juan Pablo Prakash PA-C  10/27/18 [facility] SHELBIE Murphy  10/28/18 0645    Attending Supervisory Note/Shared Visit   I have personally performed a face to face diagnostic evaluation on this patient. I have reviewed the mid-levels findings and agree.   History and Exam by me shows Hip fracture      Lupis Crowell DO   Attending Emergency Physician         Lupis Crowell DO  10/28/18 3381       Lupis Crowell DO  10/28/18 0702

## 2018-10-27 NOTE — PROGRESS NOTES
1700-patient arrived to [facility]- alert to self only. Doyle was placed and urine sample sent down. Home medications were reviewed with patients caregiver. Patients caregiver states that her children live in [place] and he doesn't know their phone number to give to me in order to do surgical consent.

## 2018-10-28 ENCOUNTER — ANESTHESIA (OUTPATIENT)
Dept: OPERATING ROOM | Age: 73
DRG: 470 | End: 2018-10-28
Payer: MEDICARE

## 2018-10-28 ENCOUNTER — ANESTHESIA EVENT (OUTPATIENT)
Dept: OPERATING ROOM | Age: 73
DRG: 470 | End: 2018-10-28
Payer: MEDICARE

## 2018-10-28 ENCOUNTER — APPOINTMENT (OUTPATIENT)
Dept: GENERAL RADIOLOGY | Age: 73
DRG: 470 | End: 2018-10-28
Payer: MEDICARE

## 2018-10-28 VITALS — TEMPERATURE: 97 F | OXYGEN SATURATION: 97 % | SYSTOLIC BLOOD PRESSURE: 136 MMHG | DIASTOLIC BLOOD PRESSURE: 93 MMHG

## 2018-10-28 PROCEDURE — 6360000002 HC RX W HCPCS: Performed by: STUDENT IN AN ORGANIZED HEALTH CARE EDUCATION/TRAINING PROGRAM

## 2018-10-28 PROCEDURE — 6360000002 HC RX W HCPCS: Performed by: ORTHOPAEDIC SURGERY

## 2018-10-28 PROCEDURE — 99222 1ST HOSP IP/OBS MODERATE 55: CPT | Performed by: INTERNAL MEDICINE

## 2018-10-28 PROCEDURE — C1713 ANCHOR/SCREW BN/BN,TIS/BN: HCPCS | Performed by: ORTHOPAEDIC SURGERY

## 2018-10-28 PROCEDURE — 94761 N-INVAS EAR/PLS OXIMETRY MLT: CPT

## 2018-10-28 PROCEDURE — C1773 RET DEV, INSERTABLE: HCPCS | Performed by: ORTHOPAEDIC SURGERY

## 2018-10-28 PROCEDURE — C1776 JOINT DEVICE (IMPLANTABLE): HCPCS | Performed by: ORTHOPAEDIC SURGERY

## 2018-10-28 PROCEDURE — 6370000000 HC RX 637 (ALT 250 FOR IP): Performed by: ORTHOPAEDIC SURGERY

## 2018-10-28 PROCEDURE — 6370000000 HC RX 637 (ALT 250 FOR IP): Performed by: NURSE PRACTITIONER

## 2018-10-28 PROCEDURE — 0SRR0J9 REPLACEMENT OF RIGHT HIP JOINT, FEMORAL SURFACE WITH SYNTHETIC SUBSTITUTE, CEMENTED, OPEN APPROACH: ICD-10-PCS | Performed by: ORTHOPAEDIC SURGERY

## 2018-10-28 PROCEDURE — 3600000004 HC SURGERY LEVEL 4 BASE: Performed by: ORTHOPAEDIC SURGERY

## 2018-10-28 PROCEDURE — 2580000003 HC RX 258: Performed by: STUDENT IN AN ORGANIZED HEALTH CARE EDUCATION/TRAINING PROGRAM

## 2018-10-28 PROCEDURE — 3600000014 HC SURGERY LEVEL 4 ADDTL 15MIN: Performed by: ORTHOPAEDIC SURGERY

## 2018-10-28 PROCEDURE — 2500000003 HC RX 250 WO HCPCS: Performed by: ORTHOPAEDIC SURGERY

## 2018-10-28 PROCEDURE — 3700000000 HC ANESTHESIA ATTENDED CARE: Performed by: ORTHOPAEDIC SURGERY

## 2018-10-28 PROCEDURE — 2500000003 HC RX 250 WO HCPCS: Performed by: STUDENT IN AN ORGANIZED HEALTH CARE EDUCATION/TRAINING PROGRAM

## 2018-10-28 PROCEDURE — 7100000000 HC PACU RECOVERY - FIRST 15 MIN: Performed by: ORTHOPAEDIC SURGERY

## 2018-10-28 PROCEDURE — 94640 AIRWAY INHALATION TREATMENT: CPT

## 2018-10-28 PROCEDURE — 73502 X-RAY EXAM HIP UNI 2-3 VIEWS: CPT

## 2018-10-28 PROCEDURE — 7100000001 HC PACU RECOVERY - ADDTL 15 MIN: Performed by: ORTHOPAEDIC SURGERY

## 2018-10-28 PROCEDURE — 2580000003 HC RX 258: Performed by: ORTHOPAEDIC SURGERY

## 2018-10-28 PROCEDURE — 2500000003 HC RX 250 WO HCPCS: Performed by: NURSE ANESTHETIST, CERTIFIED REGISTERED

## 2018-10-28 PROCEDURE — 3700000001 HC ADD 15 MINUTES (ANESTHESIA): Performed by: ORTHOPAEDIC SURGERY

## 2018-10-28 PROCEDURE — APPNB45 APP NON BILLABLE 31-45 MINUTES: Performed by: NURSE PRACTITIONER

## 2018-10-28 PROCEDURE — 2709999900 HC NON-CHARGEABLE SUPPLY: Performed by: ORTHOPAEDIC SURGERY

## 2018-10-28 PROCEDURE — 6360000002 HC RX W HCPCS: Performed by: NURSE ANESTHETIST, CERTIFIED REGISTERED

## 2018-10-28 PROCEDURE — 1210000000 HC MED SURG R&B

## 2018-10-28 DEVICE — CEMENT BNE 40 GM RADIOPAQUE BA SIMPLEX P: Type: IMPLANTABLE DEVICE | Site: HIP | Status: FUNCTIONAL

## 2018-10-28 DEVICE — IMPLANTABLE DEVICE: Type: IMPLANTABLE DEVICE | Site: HIP | Status: FUNCTIONAL

## 2018-10-28 DEVICE — VERSYS CEM LD/FX SZ 12X125MM: Type: IMPLANTABLE DEVICE | Site: HIP | Status: FUNCTIONAL

## 2018-10-28 DEVICE — VERSYS DISTAL CENTRALIZER 10MM: Type: IMPLANTABLE DEVICE | Site: HIP | Status: FUNCTIONAL

## 2018-10-28 DEVICE — BIPOLAR LINER 44/45/46MM OD X 28MM ID: Type: IMPLANTABLE DEVICE | Site: HIP | Status: FUNCTIONAL

## 2018-10-28 DEVICE — BIPOLAR SHELL 46MM OD: Type: IMPLANTABLE DEVICE | Site: HIP | Status: FUNCTIONAL

## 2018-10-28 RX ORDER — HYDROCODONE BITARTRATE AND ACETAMINOPHEN 5; 325 MG/1; MG/1
2 TABLET ORAL PRN
Status: DISCONTINUED | OUTPATIENT
Start: 2018-10-28 | End: 2018-10-28

## 2018-10-28 RX ORDER — DIPHENHYDRAMINE HYDROCHLORIDE 50 MG/ML
12.5 INJECTION INTRAMUSCULAR; INTRAVENOUS
Status: DISCONTINUED | OUTPATIENT
Start: 2018-10-28 | End: 2018-10-28

## 2018-10-28 RX ORDER — KETOROLAC TROMETHAMINE 15 MG/ML
15 INJECTION, SOLUTION INTRAMUSCULAR; INTRAVENOUS EVERY 6 HOURS
Status: COMPLETED | OUTPATIENT
Start: 2018-10-28 | End: 2018-10-29

## 2018-10-28 RX ORDER — MAGNESIUM HYDROXIDE 1200 MG/15ML
LIQUID ORAL CONTINUOUS PRN
Status: COMPLETED | OUTPATIENT
Start: 2018-10-28 | End: 2018-10-28

## 2018-10-28 RX ORDER — CEFAZOLIN SODIUM 2 G/50ML
2 SOLUTION INTRAVENOUS
Status: COMPLETED | OUTPATIENT
Start: 2018-10-28 | End: 2018-10-28

## 2018-10-28 RX ORDER — HYDROCODONE BITARTRATE AND ACETAMINOPHEN 5; 325 MG/1; MG/1
1 TABLET ORAL PRN
Status: DISCONTINUED | OUTPATIENT
Start: 2018-10-28 | End: 2018-10-28

## 2018-10-28 RX ORDER — FENTANYL CITRATE 50 UG/ML
INJECTION, SOLUTION INTRAMUSCULAR; INTRAVENOUS PRN
Status: DISCONTINUED | OUTPATIENT
Start: 2018-10-28 | End: 2018-10-28 | Stop reason: SDUPTHER

## 2018-10-28 RX ORDER — ALBUTEROL SULFATE 2.5 MG/3ML
2.5 SOLUTION RESPIRATORY (INHALATION) EVERY 6 HOURS PRN
Status: DISCONTINUED | OUTPATIENT
Start: 2018-10-28 | End: 2018-10-31 | Stop reason: HOSPADM

## 2018-10-28 RX ORDER — SODIUM CHLORIDE 0.9 % (FLUSH) 0.9 %
10 SYRINGE (ML) INJECTION EVERY 12 HOURS SCHEDULED
Status: DISCONTINUED | OUTPATIENT
Start: 2018-10-28 | End: 2018-10-31 | Stop reason: HOSPADM

## 2018-10-28 RX ORDER — METOCLOPRAMIDE HYDROCHLORIDE 5 MG/ML
10 INJECTION INTRAMUSCULAR; INTRAVENOUS
Status: DISCONTINUED | OUTPATIENT
Start: 2018-10-28 | End: 2018-10-28

## 2018-10-28 RX ORDER — SODIUM CHLORIDE, SODIUM LACTATE, POTASSIUM CHLORIDE, CALCIUM CHLORIDE 600; 310; 30; 20 MG/100ML; MG/100ML; MG/100ML; MG/100ML
INJECTION, SOLUTION INTRAVENOUS CONTINUOUS PRN
Status: DISCONTINUED | OUTPATIENT
Start: 2018-10-28 | End: 2018-10-28 | Stop reason: SDUPTHER

## 2018-10-28 RX ORDER — MORPHINE SULFATE 2 MG/ML
2 INJECTION, SOLUTION INTRAMUSCULAR; INTRAVENOUS
Status: DISPENSED | OUTPATIENT
Start: 2018-10-28 | End: 2018-10-29

## 2018-10-28 RX ORDER — LIDOCAINE HYDROCHLORIDE 20 MG/ML
INJECTION, SOLUTION INFILTRATION; PERINEURAL PRN
Status: DISCONTINUED | OUTPATIENT
Start: 2018-10-28 | End: 2018-10-28 | Stop reason: SDUPTHER

## 2018-10-28 RX ORDER — ONDANSETRON 2 MG/ML
4 INJECTION INTRAMUSCULAR; INTRAVENOUS
Status: DISCONTINUED | OUTPATIENT
Start: 2018-10-28 | End: 2018-10-28

## 2018-10-28 RX ORDER — SODIUM CHLORIDE 9 MG/ML
INJECTION, SOLUTION INTRAVENOUS CONTINUOUS
Status: DISCONTINUED | OUTPATIENT
Start: 2018-10-28 | End: 2018-10-29

## 2018-10-28 RX ORDER — BUPIVACAINE HYDROCHLORIDE 5 MG/ML
INJECTION, SOLUTION EPIDURAL; INTRACAUDAL PRN
Status: DISCONTINUED | OUTPATIENT
Start: 2018-10-28 | End: 2018-10-28 | Stop reason: HOSPADM

## 2018-10-28 RX ORDER — SODIUM CHLORIDE 0.9 % (FLUSH) 0.9 %
10 SYRINGE (ML) INJECTION PRN
Status: DISCONTINUED | OUTPATIENT
Start: 2018-10-28 | End: 2018-10-31 | Stop reason: HOSPADM

## 2018-10-28 RX ORDER — OXYCODONE HYDROCHLORIDE 5 MG/1
5 TABLET ORAL EVERY 6 HOURS PRN
Status: DISCONTINUED | OUTPATIENT
Start: 2018-10-28 | End: 2018-10-29

## 2018-10-28 RX ORDER — FENTANYL CITRATE 50 UG/ML
50 INJECTION, SOLUTION INTRAMUSCULAR; INTRAVENOUS EVERY 10 MIN PRN
Status: DISCONTINUED | OUTPATIENT
Start: 2018-10-28 | End: 2018-10-28

## 2018-10-28 RX ORDER — TRAMADOL HYDROCHLORIDE 50 MG/1
50 TABLET ORAL EVERY 6 HOURS PRN
Status: DISCONTINUED | OUTPATIENT
Start: 2018-10-28 | End: 2018-10-31 | Stop reason: HOSPADM

## 2018-10-28 RX ORDER — ACETAMINOPHEN 500 MG
500 TABLET ORAL
Status: DISCONTINUED | OUTPATIENT
Start: 2018-10-28 | End: 2018-10-31 | Stop reason: HOSPADM

## 2018-10-28 RX ORDER — PROPOFOL 10 MG/ML
INJECTION, EMULSION INTRAVENOUS PRN
Status: DISCONTINUED | OUTPATIENT
Start: 2018-10-28 | End: 2018-10-28 | Stop reason: SDUPTHER

## 2018-10-28 RX ORDER — DOCUSATE SODIUM 100 MG/1
100 CAPSULE, LIQUID FILLED ORAL 2 TIMES DAILY
Status: DISCONTINUED | OUTPATIENT
Start: 2018-10-28 | End: 2018-10-31 | Stop reason: HOSPADM

## 2018-10-28 RX ORDER — CEFAZOLIN SODIUM 2 G/50ML
2 SOLUTION INTRAVENOUS EVERY 8 HOURS
Status: COMPLETED | OUTPATIENT
Start: 2018-10-28 | End: 2018-10-29

## 2018-10-28 RX ORDER — PROPOFOL 10 MG/ML
INJECTION, EMULSION INTRAVENOUS CONTINUOUS PRN
Status: DISCONTINUED | OUTPATIENT
Start: 2018-10-28 | End: 2018-10-28 | Stop reason: SDUPTHER

## 2018-10-28 RX ORDER — BUPIVACAINE HYDROCHLORIDE 5 MG/ML
INJECTION, SOLUTION EPIDURAL; INTRACAUDAL PRN
Status: DISCONTINUED | OUTPATIENT
Start: 2018-10-28 | End: 2018-10-28 | Stop reason: SDUPTHER

## 2018-10-28 RX ORDER — SENNA AND DOCUSATE SODIUM 50; 8.6 MG/1; MG/1
1 TABLET, FILM COATED ORAL DAILY
Status: DISCONTINUED | OUTPATIENT
Start: 2018-10-28 | End: 2018-10-31 | Stop reason: HOSPADM

## 2018-10-28 RX ORDER — MEPERIDINE HYDROCHLORIDE 25 MG/ML
12.5 INJECTION INTRAMUSCULAR; INTRAVENOUS; SUBCUTANEOUS EVERY 5 MIN PRN
Status: DISCONTINUED | OUTPATIENT
Start: 2018-10-28 | End: 2018-10-28

## 2018-10-28 RX ORDER — ONDANSETRON 2 MG/ML
4 INJECTION INTRAMUSCULAR; INTRAVENOUS EVERY 6 HOURS PRN
Status: DISCONTINUED | OUTPATIENT
Start: 2018-10-28 | End: 2018-10-31 | Stop reason: HOSPADM

## 2018-10-28 RX ORDER — MORPHINE SULFATE 4 MG/ML
4 INJECTION, SOLUTION INTRAMUSCULAR; INTRAVENOUS
Status: DISCONTINUED | OUTPATIENT
Start: 2018-10-28 | End: 2018-10-29

## 2018-10-28 RX ADMIN — KETOROLAC TROMETHAMINE 15 MG: 15 INJECTION, SOLUTION INTRAMUSCULAR; INTRAVENOUS at 18:23

## 2018-10-28 RX ADMIN — PROPOFOL 40 MG: 10 INJECTION, EMULSION INTRAVENOUS at 13:41

## 2018-10-28 RX ADMIN — BUPIVACAINE HYDROCHLORIDE 10 MG: 5 INJECTION, SOLUTION EPIDURAL; INTRACAUDAL at 13:25

## 2018-10-28 RX ADMIN — ALBUTEROL SULFATE 2.5 MG: 2.5 SOLUTION RESPIRATORY (INHALATION) at 12:24

## 2018-10-28 RX ADMIN — FENTANYL CITRATE 50 MCG: 50 INJECTION, SOLUTION INTRAMUSCULAR; INTRAVENOUS at 15:33

## 2018-10-28 RX ADMIN — DIVALPROEX SODIUM 125 MG: 125 CAPSULE, COATED PELLETS ORAL at 22:42

## 2018-10-28 RX ADMIN — HYDROMORPHONE HYDROCHLORIDE 0.5 MG: 1 INJECTION, SOLUTION INTRAMUSCULAR; INTRAVENOUS; SUBCUTANEOUS at 15:51

## 2018-10-28 RX ADMIN — MEMANTINE 10 MG: 10 TABLET ORAL at 21:47

## 2018-10-28 RX ADMIN — FENTANYL CITRATE 50 MCG: 50 INJECTION, SOLUTION INTRAMUSCULAR; INTRAVENOUS at 16:21

## 2018-10-28 RX ADMIN — CEFAZOLIN SODIUM 2 G: 2 SOLUTION INTRAVENOUS at 13:38

## 2018-10-28 RX ADMIN — CEFAZOLIN SODIUM 2 G: 2 SOLUTION INTRAVENOUS at 21:00

## 2018-10-28 RX ADMIN — FENTANYL CITRATE 50 MCG: 50 INJECTION, SOLUTION INTRAMUSCULAR; INTRAVENOUS at 15:30

## 2018-10-28 RX ADMIN — PROPOFOL 50 MG: 10 INJECTION, EMULSION INTRAVENOUS at 13:20

## 2018-10-28 RX ADMIN — HYDROMORPHONE HYDROCHLORIDE 0.5 MG: 1 INJECTION, SOLUTION INTRAMUSCULAR; INTRAVENOUS; SUBCUTANEOUS at 16:05

## 2018-10-28 RX ADMIN — DOCUSATE SODIUM AND SENNOSIDES 1 TABLET: 50; 8.6 TABLET, FILM COATED ORAL at 21:46

## 2018-10-28 RX ADMIN — MORPHINE SULFATE 2 MG: 2 INJECTION, SOLUTION INTRAMUSCULAR; INTRAVENOUS at 01:13

## 2018-10-28 RX ADMIN — PROPOFOL 50 MCG/KG/MIN: 10 INJECTION, EMULSION INTRAVENOUS at 13:41

## 2018-10-28 RX ADMIN — DOCUSATE SODIUM 100 MG: 100 CAPSULE, LIQUID FILLED ORAL at 21:47

## 2018-10-28 RX ADMIN — RANOLAZINE 500 MG: 500 TABLET, FILM COATED, EXTENDED RELEASE ORAL at 21:47

## 2018-10-28 RX ADMIN — OXYCODONE HYDROCHLORIDE 5 MG: 5 TABLET ORAL at 21:47

## 2018-10-28 RX ADMIN — SODIUM CHLORIDE, POTASSIUM CHLORIDE, SODIUM LACTATE AND CALCIUM CHLORIDE: 600; 310; 30; 20 INJECTION, SOLUTION INTRAVENOUS at 13:20

## 2018-10-28 RX ADMIN — MELATONIN TAB 3 MG 3 MG: 3 TAB at 21:47

## 2018-10-28 RX ADMIN — DONEPEZIL HYDROCHLORIDE 5 MG: 10 TABLET, FILM COATED ORAL at 21:46

## 2018-10-28 RX ADMIN — LIDOCAINE HYDROCHLORIDE 5 ML: 20 INJECTION, SOLUTION INFILTRATION; PERINEURAL at 13:41

## 2018-10-28 RX ADMIN — ACETAMINOPHEN 500 MG: 500 TABLET ORAL at 21:46

## 2018-10-28 ASSESSMENT — PULMONARY FUNCTION TESTS
PIF_VALUE: 1
PIF_VALUE: 0
PIF_VALUE: 1
PIF_VALUE: 0
PIF_VALUE: 1
PIF_VALUE: 0
PIF_VALUE: 1
PIF_VALUE: 0
PIF_VALUE: 1
PIF_VALUE: 0
PIF_VALUE: 1
PIF_VALUE: 0
PIF_VALUE: 1
PIF_VALUE: 0
PIF_VALUE: 1

## 2018-10-28 ASSESSMENT — PAIN SCALES - GENERAL
PAINLEVEL_OUTOF10: 8
PAINLEVEL_OUTOF10: 6
PAINLEVEL_OUTOF10: 10
PAINLEVEL_OUTOF10: 7
PAINLEVEL_OUTOF10: 10

## 2018-10-28 ASSESSMENT — PAIN DESCRIPTION - PAIN TYPE
TYPE: SURGICAL PAIN

## 2018-10-28 ASSESSMENT — ENCOUNTER SYMPTOMS
CONSTIPATION: 0
SORE THROAT: 0
SHORTNESS OF BREATH: 0
COLOR CHANGE: 0
ABDOMINAL DISTENTION: 0
RHINORRHEA: 0
EYE DISCHARGE: 0
ABDOMINAL PAIN: 0
BACK PAIN: 0

## 2018-10-28 ASSESSMENT — PAIN DESCRIPTION - ORIENTATION
ORIENTATION: RIGHT

## 2018-10-28 ASSESSMENT — PAIN DESCRIPTION - DESCRIPTORS: DESCRIPTORS: SORE

## 2018-10-28 ASSESSMENT — PAIN DESCRIPTION - LOCATION
LOCATION: HIP

## 2018-10-28 ASSESSMENT — PAIN SCALES - WONG BAKER
WONGBAKER_NUMERICALRESPONSE: 4
WONGBAKER_NUMERICALRESPONSE: 6

## 2018-10-28 NOTE — PROGRESS NOTES
[facility] AT [facility] Respiratory Therapy Evaluation   Current Order:  Albuterol q6 prn      Home Regimen: none      Ordering Physician: kristie  Re-evaluation Date:  10/30     Diagnosis: ill      Patient Status: Stable / Unstable + Physician notified    The following MDI Criteria must be met in order to convert aerosol to MDI with spacer. If unable to meet, MDI will be converted to aerosol:  []  Patient able to demonstrate the ability to use MDI effectively  []  Patient alert and cooperative  []  Patient able to take deep breath with 5-10 second hold  []  Medication(s) available in this delivery method   []  Peak flow greater than or equal to 200 ml/min            Current Order Substituted To  (same drug, same frequency)   Aerosol to MDI [] Albuterol Sulfate 0.083% unit dose by aerosol Albuterol Sulfate MDI 2 puffs by inhalation with spacer    [] Levalbuterol 1.25 mg unit dose by aerosol Levalbuterol MDI 2 puffs by inhalation with spacer    [] Levalbuterol 0.63 mg unit dose by aerosol Levalbuterol MDI 2 puffs by inhalation with spacer    [] Ipratropium Bromide 0.02% unit dose by aerosol Ipratropium Bromide MDI 2 puffs by inhalation with spacer    [] Duoneb (Ipratropium + Albuterol) unit dose by aerosol Ipratropium MDI + Albuterol MDI 2 puffs by inhalation w/spacer   MDI to Aerosol [] Albuterol Sulfate MDI Albuterol Sulfate 0.083% unit dose by aerosol    [] Levalbuterol MDI 2 puffs by inhalation Levalbuterol 1.25 mg unit dose by aerosol    [] Ipratropium Bromide MDI by inhalation Ipratropium Bromide 0.02% unit dose by aerosol    [] Combivent (Ipratropium + Albuterol) MDI by inhalation Duoneb (Ipratropium + Albuterol) unit dose by aerosol   Treatment Assessment [Frequency/Schedule]:  Change frequency to: _____________no change_____________________________________per Protocol, P&T, MEC      Points 0 1 2 3 4   Pulmonary Status  Non-Smoker  []   Smoking history   < 20 pack years  []   Smoking history  ?  20 pack years  [x]

## 2018-10-28 NOTE — CONSULTS
Consult Note  Patient: Emilia Padilla  Unit/Bed: N698/E658-81  YOB: 1945  MRN: [id number]  Acct: [de-identified]   Admitting Diagnosis: [facility] right [facility]  [facility]  Hospital Day: 1      Chief Complaint:  Right hip fracture    History of Present Illness:  82-year-old female lives at home with a caretaker had fallen and fractured her right hip they kindly asked cardiology get involved with her case for preop clearance. 2 years ago we did do an echo her ejection fraction was 60% at that time and she also has a history of a left bundle-branch block. The patient is awake but confused she denies having any chest pain or shortness of breath at this time. a history of dementia, left bundle branch block, dyslipidemia, hypertension.     No Known Allergies    Current Facility-Administered Medications   Medication Dose Route Frequency Provider Last Rate Last Dose    ceFAZolin (ANCEF) 2 g in dextrose 3 % 50 mL IVPB (duplex)  2 g Intravenous On Call to Camilla Perez MD        0.9 % sodium chloride infusion   Intravenous Continuous Sharmaine Biswas MD 50 mL/hr at 10/27/18 1700      sodium chloride flush 0.9 % injection 10 mL  10 mL Intravenous 2 times per day Sharmaine Biswas MD        sodium chloride flush 0.9 % injection 10 mL  10 mL Intravenous PRN Sharmaine Biswas MD        oxyCODONE-acetaminophen (PERCOCET) 5-325 MG per tablet 1 tablet  1 tablet Oral Q4H PRN Sharmaine Biswas MD   1 tablet at 10/27/18 2152    Or    oxyCODONE-acetaminophen (PERCOCET) 5-325 MG per tablet 2 tablet  2 tablet Oral Q4H PRN Sharmaine Biswas MD        morphine injection 2 mg  2 mg Intravenous Q2H PRN Sharmaine Biswas MD   2 mg at 10/28/18 0113    Or    morphine injection 4 mg  4 mg Intravenous Q2H PRN Sharmaine Biswas MD   4 mg at 10/27/18 1659    ondansetron (ZOFRAN) injection 4 mg  4 mg Intravenous Q6H PRN Sharmaine Biswas MD        amLODIPine (NORVASC) tablet 10 mg  10 mg
Lab Results   Component Value Date     10/27/2018    K 3.5 10/27/2018    K 3.3 09/14/2018     10/27/2018    CO2 26 10/27/2018    BUN 9 10/27/2018    CREATININE 0.56 10/27/2018    GFRAA >60.0 10/27/2018    LABGLOM >60.0 10/27/2018    GLUCOSE 103 10/27/2018    PROT 6.1 10/27/2018    LABALBU 2.9 10/27/2018    CALCIUM 8.6 10/27/2018    BILITOT 0.3 10/27/2018    ALKPHOS 79 10/27/2018    AST 19 10/27/2018    ALT 7 10/27/2018     BMP:    Lab Results   Component Value Date     10/27/2018    K 3.5 10/27/2018    K 3.3 09/14/2018     10/27/2018    CO2 26 10/27/2018    BUN 9 10/27/2018    LABALBU 2.9 10/27/2018    CREATININE 0.56 10/27/2018    CALCIUM 8.6 10/27/2018    GFRAA >60.0 10/27/2018    LABGLOM >60.0 10/27/2018    GLUCOSE 103 10/27/2018     Magnesium:    Lab Results   Component Value Date    MG 1.8 09/14/2018     Troponin:    Lab Results   Component Value Date    TROPONINI <0.010 10/20/2017       EKG: EKG: normal sinus rhythm, LBBB. ASSESSMENT/PLAN:      Active Hospital Problems    Diagnosis Date Noted    Closed right hip fracture, initial encounter [facility]) [S72.001A] 10/27/2018        Acceptable risk for surgery without further workup. Electronically signed by Tamela Sood.  Stacy Michael MD [place] [facility] Director of Cardiology Services and CardiacCatheterization Laboratory  [facility], [place]   on 10/28/2018 at 1:14 PM
joints. Pain on palpation to right hip, no rotation or shortening noted.    NEUROLOGIC:  Awake, alert and oriented to self  SKIN:  no bruising or bleeding and normal skin color, texture, turgor    Labs:  Recent Results (from the past 24 hour(s))   Comprehensive Metabolic Panel    Collection Time: 10/27/18  1:45 PM   Result Value Ref Range    Sodium 144 132 - 144 mEq/L    Potassium 3.5 3.5 - 5.1 mEq/L    Chloride 104 98 - 107 mEq/L    CO2 26 22 - 29 mEq/L    Anion Gap 14 (H) 7 - 13 mEq/L    Glucose 103 74 - 109 mg/dL    BUN 9 8 - 23 mg/dL    CREATININE 0.56 0.50 - 0.90 mg/dL    GFR Non-African American >60.0 >60    GFR  >60.0 >60    Calcium 8.6 8.6 - 10.2 mg/dL    Total Protein 6.1 (L) 6.4 - 8.1 g/dL    Alb 2.9 (L) 3.9 - 4.9 g/dL    Total Bilirubin 0.3 0.0 - 1.2 mg/dL    Alkaline Phosphatase 79 40 - 130 U/L    ALT 7 0 - 33 U/L    AST 19 0 - 35 U/L    Globulin 3.2 2.3 - 3.5 g/dL   CBC Auto Differential    Collection Time: 10/27/18  1:45 PM   Result Value Ref Range    WBC 8.7 4.8 - 10.8 K/uL    RBC 3.82 (L) 4.20 - 5.40 M/uL    Hemoglobin 12.0 12.0 - 16.0 g/dL    Hematocrit 36.0 (L) 37.0 - 47.0 %    MCV 94.3 82.0 - 100.0 fL    MCH 31.5 (H) 27.0 - 31.3 pg    MCHC 33.4 33.0 - 37.0 %    RDW 17.0 (H) 11.5 - 14.5 %    Platelets 515 648 - 172 K/uL    PLATELET SLIDE REVIEW Normal     Neutrophils % 81.0 %    Lymphocytes % 13.0 %    Monocytes % 2.8 %    Eosinophils % 1.3 %    Basophils % 1.0 %    Neutrophils # 7.4 (H) 1.4 - 6.5 K/uL    Lymphocytes # 1.1 1.0 - 4.8 K/uL    Monocytes # 0.3 0.2 - 0.8 K/uL    Eosinophils # 0.0 0.0 - 0.7 K/uL    Basophils # 0.0 0.0 - 0.2 K/uL    Bands Relative 1 (L) 5 - 11 %    Metamyelocytes Relative 1 %    Myelocytes Relative 2 %   TYPE AND SCREEN    Collection Time: 10/27/18  1:45 PM   Result Value Ref Range    ABO/Rh O POS     Antibody Screen NEG    Protime-INR    Collection Time: 10/27/18  1:45 PM   Result Value Ref Range    Protime 11.4 9.6 - 12.3 sec    INR 1.1    APTT

## 2018-10-28 NOTE — H&P
Orthopaedic Surgery Admission Note    Chief Complaint   Patient presents with    Fall     Pt fell today, has right upper thigh pain. History of Present Illness: Ca Valiente is a 72year-old female with medical history significant for dementia who was brought to the ED last night with right hip pain after a mechanical fall. The patient is a poor historian and the history is obtained from the electronic medical record. Per report, she lost her balance yesterday and was caught by her caregiver. She has not been able to stand or ambulate due to severe pain in her hip. She does not complain of pain in the right hip this morning, but indicates she is having some left sided hip pain. Physical Exam:    General:  Alert and oriented to person only, NAD, well kempt, and of stated age. Vital Signs:  BP (!) 138/46   Pulse 63   Temp 98.1 °F (36.7 °C) (Oral)   Resp 18   Ht 5' 6\" (1.676 m)   Wt 169 lb 12.1 oz (77 kg)   SpO2 95%   BMI 27.40 kg/m²  , Body mass index is 27.4 kg/m². HEENT:  Head is normocephalic and atraumatic. Extraocular muscles are grossly intact. Neck:  Supple, no visible swelling. Cardiovascular:  Hemodynamically stable. Respiratory:  No audible wheezing, unlabored respirations. Skin:  Clean and dry, well kempt. Right hip: Skin intact. Mild tenderness in the groin. No pain with logroll. Pain with increasing passive range of motion of the hip. Unable to maintain straight leg raise off of the bed. No tenderness more distally in the thigh or the leg. Motor and sensory intact in the foot. Toes are warm and well-perfused. Assessment/Plan: 72year-old female with right femoral neck fracture. 1. Fracture is mildly displaced and percutaneous pinning and hip hemiarthroplasty are both reasonable treatment options. Per report, patient does ambulate with assistance, therefore will plan for hip hemiarthroplasty.  The benefits and risks of operative and non-operative management were discussed with the patient's son who is the durable power of . We discussed the procedure and the expected postoperative course. Risks, benefits, alternatives, and possible complications of the procedure including but not limited to the risk of infection, bleeding, injury to nerves, tendons, and surrounding structures, and anesthesia risks were discussed. Additional risks of persistent pain, fracture, dislocation, limb length discrepancy, hip stiffness or instability, component loosening, progressive acetabular wear, deep venous thrombosis or pulmonary embolism, need for further surgery, and loss of life or limb were also discussed. The patient's durable power of  indicated an understanding of these risks, benefits, alternatives, and possible complications and consented to the procedure. 2. Pain control  3. Bedrest  4. Continue NPO  5. Appreciate internal medicine medical management. Past Medical History    Past Medical History:   Diagnosis Date    Anxiety     Chronic back pain     Dementia     Depression     GERD (gastroesophageal reflux disease)     Hyperlipidemia     Hypertension     Osteoarthritis        Past Surgical History    Past Surgical History:   Procedure Laterality Date    COLONOSCOPY N/A 12/10/2016    COLONOSCOPY & BIOPSY performed by Tarah Diaz MD at [address]      TOTAL KNEE ARTHROPLASTY  2/4/2016       Allergies    Patient has no known allergies.     Medications      Current Facility-Administered Medications   Medication Dose Route Frequency Provider Last Rate Last Dose    ceFAZolin (ANCEF) 2 g in dextrose 3 % 50 mL IVPB (duplex)  2 g Intravenous On Call to Jing Bradford MD        0.9 % sodium chloride infusion   Intravenous Continuous Stanley Dempsey MD 50 mL/hr at 10/27/18 1700      sodium chloride flush 0.9 % injection 10 mL  10 mL Intravenous 2 times per day Stanley Dempsey MD        sodium chloride flush 0.9 % injection 10 10/27/2018    CREATININE 0.56 10/27/2018    GFRAA >60.0 10/27/2018    LABGLOM >60.0 10/27/2018    GLUCOSE 103 10/27/2018    PROT 6.1 10/27/2018    LABALBU 2.9 10/27/2018    CALCIUM 8.6 10/27/2018    BILITOT 0.3 10/27/2018    ALKPHOS 79 10/27/2018    AST 19 10/27/2018    ALT 7 10/27/2018     PT/INR:    Lab Results   Component Value Date    PROTIME 12.8 10/27/2018    INR 1.2 10/27/2018     PTT:    Lab Results   Component Value Date    APTT 27.9 10/27/2018   [APTT}    Xr Chest (single View Frontal)    Result Date: 10/27/2018  XR CHEST (SINGLE VIEW FRONTAL) : 10/27/2018 CLINICAL HISTORY: Right hip pain after falling. COMPARISON: 9/12/2018. TECHNIQUE: A supine portable AP radiograph of the chest was obtained. FINDINGS: A shallow inspiration is present without significant infiltrate identified. There is no cardiomegaly, significant pleural effusion, vascular congestion, pneumothorax, or displaced fractures identified. Orthopedic anchors are again noted within the right humeral head. NO EVIDENCE OF ACTIVE CARDIOPULMONARY DISEASE. Xr Femur Right (min 2 Views)    Result Date: 10/27/2018  XR HIP 2-3 VW W PELVIS RIGHT, XR FEMUR RIGHT (MIN 2 VIEWS) : 10/27/2018 CLINICAL HISTORY: Pain after falling. COMPARISON: Bilateral hip CT 9/27/2018. TECHNIQUE: An AP radiograph of the pelvis, an a lateral radiograph of the right hip, and AP and lateral radiographs of the right femur were obtained. FINDINGS: A minimally displaced oblique fracture of the subcapital to mid right femoral neck is noted. There is no other fracture, dislocation, radiodense foreign bodies, worrisome bone destruction, or other significant changes from the prior studies identified. The sacroiliac joints are intact. MINIMALLY DISPLACED OBLIQUE RIGHT FEMORAL NECK FRACTURE. Xr Hip 2-3 Vw W Pelvis Right    Result Date: 10/27/2018  XR HIP 2-3 VW W PELVIS RIGHT, XR FEMUR RIGHT (MIN 2 VIEWS) : 10/27/2018 CLINICAL HISTORY: Pain after falling.  COMPARISON:

## 2018-10-28 NOTE — OP NOTE
Orthopaedic Surgery Operative Note         Date of Surgery: 10/28/2018    Admit Date: 10/27/2018    Location: [place] [address]    Performing Service: Orthopaedic Surgery    Patient Name:  Tato Casey    Preoperative Diagnosis:  Right femoral neck fracture    Postoperative Diagnosis:  Same    Operative Procedure:  Right hip hemiarthroplasty    Surgeon:  Nuha Riggs MD    Assistant:  Guillermo Kinsey PA-C    Anesthesia: General    Estimated Blood Loss: 300 mL    Specimens: * No specimens in log *     Drains: None    Implants:   Implant Name Type Inv. Item Serial No.  Lot No. LRB No. Used Action   IMPL HIP FEM SHLL MULTIPOLAR BIPLR 46MM Hip IMPL HIP FEM SHLL MULTIPOLAR BIPLR 46MM  [employer]  Right 1 Implanted   IMPL HIP FEM STEM VERSYS [id number] Hip IMPL HIP FEM STEM VERSYS [id number]  [employer]  Right 1 Implanted   IMPL HIP FEM CENTRALZR DISTL VERSYS 10MM Hip IMPL HIP FEM CENTRALZR DISTL VERSYS 10MM  [employer]  Right 1 Implanted   IMPL HIP FEM LINER BIPLR [id number] Hip IMPL HIP FEM LINER BIPLR [id number]  [employer]  Right 1 Implanted   [employer]    [id number] Right 2 Implanted   IMPL HIP FEM HEAD VERSYS [id number] 10.5MM Hip IMPL HIP FEM HEAD VERSYS 12TO14 10.5MM   [employer]   Right 1 Implanted       Complications: * No complications entered in OR log *    Disposition: To PACU in stable condition. Indications: Tato Casey is a 72 y.o. female who sustained a femoral neck fracture of the right hip. Operative intervention was recommended. Benefits and risks of operative and nonoperative management were discussed with the patient's durable power of  prior to surgery. The risks, benefits, alternatives, and possible complications of the procedure including but not limited to the risk of infection, bleeding, injury to nerves, tendons, and surrounding structures, and anesthesia risks were discussed.  The possibilities of persistent pain, fracture, dislocation, component loosening, deep venous thrombosis, Postoperative DVT prophylaxis and postoperative antibiotics prophylaxis will be provided. Follow up in the office in 10-14 days for wound check and X-ray.       Emma Willson   Date: 10/28/2018  Time: 3:20 PM

## 2018-10-28 NOTE — PROGRESS NOTES
PHARMACY NOTE:  Drug Shortage Communication and Action Plan: IV Opioids    Kelby Hamainor was ordered Morphine 2 mg or 4 mg IV q2h prn  .  Per the [address], the appropriate stop time was implemented per pharmacy upon order verification based upon the below parameters due to recent drug shortage    IV Opioid Stop Time:  Patient with a diet 24 hour stop time X   Patient NPO 72 hour stop time

## 2018-10-28 NOTE — PROGRESS NOTES
Avisis placed in pt's room for safety. Pt was attempting to get out of bed. Pt denies pain at this time. Previously medicated with morphine for pain to right hip. Pt continues to be alert to self only. Frequently being reoriented and repositioned. Call light in reach. Bed alarm on.

## 2018-10-28 NOTE — ANESTHESIA PRE PROCEDURE
Department of Anesthesiology  Preprocedure Note       Name:  Kelby Zavala   Age:  72 y.o.  :  1945                                          MRN:  [id number]         Date:  10/28/2018      Surgeon: Maryella Schwab):  Rere Marshall MD    Procedure: HIP HEMIARTHROPLASTY (Right )    Medications prior to admission:   Prior to Admission medications    Medication Sig Start Date End Date Taking?  Authorizing Provider   divalproex (DEPAKOTE SPRINKLES) 125 MG capsule Take 125 mg by mouth 3 times daily   Yes Historical Provider, MD   donepezil (ARICEPT) 5 MG tablet Take 5 mg by mouth 2 times daily   Yes Historical Provider, MD   amLODIPine (NORVASC) 5 MG tablet Take 2 tablets by mouth daily 18  Yes Liyah Gomez MD   OLANZapine zydis (ZYPREXA) 5 MG disintegrating tablet Take 0.5 tablets by mouth 2 times daily as needed (agitation) 18  Yes Sallie Marin MD   melatonin 3 MG TABS tablet Take 3 mg by mouth nightly   Yes Historical Provider, MD   memantine (NAMENDA XR) 28 MG CP24 extended release capsule Take 1 capsule by mouth daily 16  Yes NYDIA Turpin - CNP   omeprazole (PRILOSEC) 40 MG delayed release capsule Take 40 mg by mouth daily   Yes Historical Provider, MD   aspirin 81 MG tablet Take 81 mg by mouth daily   Yes Historical Provider, MD   atorvastatin (LIPITOR) 10 MG tablet Take 10 mg by mouth daily   Yes Historical Provider, MD   potassium chloride SA (K-DUR;KLOR-CON M) 10 MEQ tablet Take 30 mEq by mouth daily    Yes Historical Provider, MD   ranolazine (RANEXA) 500 MG SR tablet Take 500 mg by mouth 2 times daily   Yes Historical Provider, MD   metoprolol (TOPROL-XL) 50 MG XL tablet Take 50 mg by mouth daily   Yes Historical Provider, MD   ibuprofen (ADVIL;MOTRIN) 800 MG tablet Take 1 tablet by mouth every 6 hours as needed for Pain 17   NYDIA Vega CNP   nitroGLYCERIN (NITROSTAT) 0.4 MG SL tablet Place 0.4 mg under the tongue every 5 minutes as needed for Chest pain    Historical

## 2018-10-28 NOTE — PROGRESS NOTES
Pt.'s son charo returned jose thompson Explaining change in consent. Dr. Marylen Salmon is now on phone and explaining surgical procedure for consent.

## 2018-10-29 PROBLEM — R26.2 DIFFICULTY WALKING: Status: ACTIVE | Noted: 2018-07-16

## 2018-10-29 PROBLEM — I42.9 PRIMARY CARDIOMYOPATHY (HCC): Status: ACTIVE | Noted: 2018-10-29

## 2018-10-29 PROBLEM — G30.1 LATE ONSET ALZHEIMER'S DISEASE WITH BEHAVIORAL DISTURBANCE (HCC): Status: ACTIVE | Noted: 2017-04-03

## 2018-10-29 PROBLEM — F02.818 LATE ONSET ALZHEIMER'S DISEASE WITH BEHAVIORAL DISTURBANCE (HCC): Status: ACTIVE | Noted: 2017-04-03

## 2018-10-29 PROBLEM — R06.00 DYSPNEA: Status: ACTIVE | Noted: 2018-10-29

## 2018-10-29 PROBLEM — I25.10 CORONARY ATHEROSCLEROSIS: Status: ACTIVE | Noted: 2018-10-29

## 2018-10-29 LAB
ANION GAP SERPL CALCULATED.3IONS-SCNC: 10 MEQ/L (ref 7–13)
BUN BLDV-MCNC: 10 MG/DL (ref 8–23)
CALCIUM SERPL-MCNC: 7.8 MG/DL (ref 8.6–10.2)
CHLORIDE BLD-SCNC: 108 MEQ/L (ref 98–107)
CO2: 27 MEQ/L (ref 22–29)
CREAT SERPL-MCNC: 0.53 MG/DL (ref 0.5–0.9)
GFR AFRICAN AMERICAN: >60
GFR NON-AFRICAN AMERICAN: >60
GLUCOSE BLD-MCNC: 100 MG/DL (ref 74–109)
HCT VFR BLD CALC: 31.4 % (ref 37–47)
HEMOGLOBIN: 10.5 G/DL (ref 12–16)
LV EF: 60 %
LVEF MODALITY: NORMAL
MCH RBC QN AUTO: 32 PG (ref 27–31.3)
MCHC RBC AUTO-ENTMCNC: 33.2 % (ref 33–37)
MCV RBC AUTO: 96.4 FL (ref 82–100)
PDW BLD-RTO: 17.4 % (ref 11.5–14.5)
PLATELET # BLD: 245 K/UL (ref 130–400)
POTASSIUM REFLEX MAGNESIUM: 3.7 MEQ/L (ref 3.5–5.1)
RBC # BLD: 3.26 M/UL (ref 4.2–5.4)
SODIUM BLD-SCNC: 145 MEQ/L (ref 132–144)
WBC # BLD: 11.1 K/UL (ref 4.8–10.8)

## 2018-10-29 PROCEDURE — 2700000000 HC OXYGEN THERAPY PER DAY

## 2018-10-29 PROCEDURE — 97167 OT EVAL HIGH COMPLEX 60 MIN: CPT

## 2018-10-29 PROCEDURE — 93306 TTE W/DOPPLER COMPLETE: CPT

## 2018-10-29 PROCEDURE — 97535 SELF CARE MNGMENT TRAINING: CPT

## 2018-10-29 PROCEDURE — 6370000000 HC RX 637 (ALT 250 FOR IP): Performed by: NURSE PRACTITIONER

## 2018-10-29 PROCEDURE — G8997 SWALLOW GOAL STATUS: HCPCS

## 2018-10-29 PROCEDURE — 80048 BASIC METABOLIC PNL TOTAL CA: CPT

## 2018-10-29 PROCEDURE — APPNB15 APP NON BILLABLE TIME 0-15 MINS: Performed by: NURSE PRACTITIONER

## 2018-10-29 PROCEDURE — 93010 ELECTROCARDIOGRAM REPORT: CPT | Performed by: INTERNAL MEDICINE

## 2018-10-29 PROCEDURE — 6370000000 HC RX 637 (ALT 250 FOR IP): Performed by: ORTHOPAEDIC SURGERY

## 2018-10-29 PROCEDURE — 36415 COLL VENOUS BLD VENIPUNCTURE: CPT

## 2018-10-29 PROCEDURE — 6360000002 HC RX W HCPCS: Performed by: ORTHOPAEDIC SURGERY

## 2018-10-29 PROCEDURE — G8979 MOBILITY GOAL STATUS: HCPCS

## 2018-10-29 PROCEDURE — 2580000003 HC RX 258: Performed by: ORTHOPAEDIC SURGERY

## 2018-10-29 PROCEDURE — G8978 MOBILITY CURRENT STATUS: HCPCS

## 2018-10-29 PROCEDURE — 97530 THERAPEUTIC ACTIVITIES: CPT

## 2018-10-29 PROCEDURE — 97116 GAIT TRAINING THERAPY: CPT

## 2018-10-29 PROCEDURE — 92610 EVALUATE SWALLOWING FUNCTION: CPT

## 2018-10-29 PROCEDURE — G8987 SELF CARE CURRENT STATUS: HCPCS

## 2018-10-29 PROCEDURE — 1210000000 HC MED SURG R&B

## 2018-10-29 PROCEDURE — 97162 PT EVAL MOD COMPLEX 30 MIN: CPT

## 2018-10-29 PROCEDURE — G8996 SWALLOW CURRENT STATUS: HCPCS

## 2018-10-29 PROCEDURE — 99232 SBSQ HOSP IP/OBS MODERATE 35: CPT | Performed by: INTERNAL MEDICINE

## 2018-10-29 PROCEDURE — G8988 SELF CARE GOAL STATUS: HCPCS

## 2018-10-29 PROCEDURE — 85027 COMPLETE CBC AUTOMATED: CPT

## 2018-10-29 RX ORDER — OXYCODONE HYDROCHLORIDE 5 MG/1
5 TABLET ORAL EVERY 6 HOURS PRN
Status: DISCONTINUED | OUTPATIENT
Start: 2018-10-29 | End: 2018-10-31 | Stop reason: HOSPADM

## 2018-10-29 RX ADMIN — METOPROLOL SUCCINATE 50 MG: 50 TABLET, EXTENDED RELEASE ORAL at 09:11

## 2018-10-29 RX ADMIN — OXYCODONE HYDROCHLORIDE 5 MG: 5 TABLET ORAL at 04:39

## 2018-10-29 RX ADMIN — DOCUSATE SODIUM AND SENNOSIDES 1 TABLET: 50; 8.6 TABLET, FILM COATED ORAL at 20:50

## 2018-10-29 RX ADMIN — CEFAZOLIN SODIUM 2 G: 2 SOLUTION INTRAVENOUS at 04:00

## 2018-10-29 RX ADMIN — ACETAMINOPHEN 500 MG: 500 TABLET ORAL at 18:22

## 2018-10-29 RX ADMIN — RANOLAZINE 500 MG: 500 TABLET, FILM COATED, EXTENDED RELEASE ORAL at 09:11

## 2018-10-29 RX ADMIN — OXYCODONE HYDROCHLORIDE 5 MG: 5 TABLET ORAL at 11:39

## 2018-10-29 RX ADMIN — DONEPEZIL HYDROCHLORIDE 5 MG: 10 TABLET, FILM COATED ORAL at 20:51

## 2018-10-29 RX ADMIN — MEMANTINE 10 MG: 10 TABLET ORAL at 09:11

## 2018-10-29 RX ADMIN — ACETAMINOPHEN 500 MG: 500 TABLET ORAL at 09:11

## 2018-10-29 RX ADMIN — AMLODIPINE BESYLATE 10 MG: 10 TABLET ORAL at 09:11

## 2018-10-29 RX ADMIN — DONEPEZIL HYDROCHLORIDE 5 MG: 10 TABLET, FILM COATED ORAL at 09:11

## 2018-10-29 RX ADMIN — Medication 10 ML: at 21:00

## 2018-10-29 RX ADMIN — ACETAMINOPHEN 500 MG: 500 TABLET ORAL at 04:38

## 2018-10-29 RX ADMIN — DIVALPROEX SODIUM 125 MG: 125 CAPSULE, COATED PELLETS ORAL at 14:34

## 2018-10-29 RX ADMIN — ENOXAPARIN SODIUM 40 MG: 40 INJECTION SUBCUTANEOUS at 09:10

## 2018-10-29 RX ADMIN — DOCUSATE SODIUM 100 MG: 100 CAPSULE, LIQUID FILLED ORAL at 09:11

## 2018-10-29 RX ADMIN — OXYCODONE HYDROCHLORIDE 5 MG: 5 TABLET ORAL at 20:53

## 2018-10-29 RX ADMIN — PANTOPRAZOLE SODIUM 40 MG: 40 TABLET, DELAYED RELEASE ORAL at 06:56

## 2018-10-29 RX ADMIN — MORPHINE SULFATE 2 MG: 2 INJECTION, SOLUTION INTRAMUSCULAR; INTRAVENOUS at 03:02

## 2018-10-29 RX ADMIN — MEMANTINE 10 MG: 10 TABLET ORAL at 21:00

## 2018-10-29 RX ADMIN — ACETAMINOPHEN 500 MG: 500 TABLET ORAL at 14:31

## 2018-10-29 RX ADMIN — DOCUSATE SODIUM 100 MG: 100 CAPSULE, LIQUID FILLED ORAL at 20:53

## 2018-10-29 RX ADMIN — ATORVASTATIN CALCIUM 10 MG: 10 TABLET, FILM COATED ORAL at 09:11

## 2018-10-29 RX ADMIN — RANOLAZINE 500 MG: 500 TABLET, FILM COATED, EXTENDED RELEASE ORAL at 20:51

## 2018-10-29 RX ADMIN — DIVALPROEX SODIUM 125 MG: 125 CAPSULE, COATED PELLETS ORAL at 09:11

## 2018-10-29 RX ADMIN — MELATONIN TAB 3 MG 3 MG: 3 TAB at 20:50

## 2018-10-29 RX ADMIN — DIVALPROEX SODIUM 125 MG: 125 CAPSULE, COATED PELLETS ORAL at 20:50

## 2018-10-29 RX ADMIN — KETOROLAC TROMETHAMINE 15 MG: 15 INJECTION, SOLUTION INTRAMUSCULAR; INTRAVENOUS at 00:04

## 2018-10-29 ASSESSMENT — PAIN SCALES - GENERAL
PAINLEVEL_OUTOF10: 4
PAINLEVEL_OUTOF10: 4
PAINLEVEL_OUTOF10: 2
PAINLEVEL_OUTOF10: 8
PAINLEVEL_OUTOF10: 8
PAINLEVEL_OUTOF10: 7
PAINLEVEL_OUTOF10: 4
PAINLEVEL_OUTOF10: 5

## 2018-10-29 ASSESSMENT — PAIN DESCRIPTION - PAIN TYPE
TYPE: SURGICAL PAIN
TYPE: SURGICAL PAIN

## 2018-10-29 ASSESSMENT — PAIN SCALES - WONG BAKER: WONGBAKER_NUMERICALRESPONSE: 4

## 2018-10-29 ASSESSMENT — PAIN DESCRIPTION - LOCATION
LOCATION: HIP

## 2018-10-29 ASSESSMENT — PAIN DESCRIPTION - ORIENTATION
ORIENTATION: RIGHT

## 2018-10-29 ASSESSMENT — PAIN DESCRIPTION - DESCRIPTORS
DESCRIPTORS: ACHING
DESCRIPTORS: SORE

## 2018-10-29 NOTE — PROGRESS NOTES
Physical Therapy Med Surg Daily Treatment Note  Facility/Department: [facility] NEURO  Room: N227/N227-01       NAME: Rob Ortiz  : 1945 (89 y.o.)  MRN: [id number]  CODE STATUS: Full Code    Date of Service: 10/29/2018    Patient Diagnosis(es): Closed right hip fracture, initial encounter [facility]) Radha Apo   Chief Complaint   Patient presents with    Fall     Pt fell today, has right upper thigh pain.      Patient Active Problem List    Diagnosis Date Noted    Closed fracture of right hip ([address]      Priority: High    Coronary atherosclerosis 10/29/2018    Dyspnea 10/29/2018    Primary cardiomyopathy ([address] 10/29/2018    Preop cardiovascular exam     Closed [facility], [facility] 10/27/2018    Acute cystitis 2018    Dementia with behavioral disturbance 2018    Altered mental state 2018    Difficulty walking 2018    Late onset Alzheimer's disease with behavioral disturbance 2017    Lactic acid acidosis 2016    Anemia due to GI blood loss 2016    Hypokalemia 2016    Intractable abdominal pain 2016    Dementia     Dementia associated with alcoholism ([address] 2015    Osteoarthritis     Chronic back pain     GERD (gastroesophageal reflux disease)     Hyperlipidemia     Hypertension     Depression     Anxiety     Osteoarthritis of spine with radiculopathy, lumbar region 10/15/2015    Excess weight 10/15/2015    Nicotine dependence, uncomplicated     Primary osteoarthritis of left knee 10/15/2015    Scoliosis 10/15/2015    Lumbar spondylosis 2015    Facet syndrome, lumbar 2015    OA (osteoarthritis) of knee 2015        Past Medical History:   Diagnosis Date    Anxiety     Chronic back pain     Dementia     Depression     GERD (gastroesophageal reflux disease)     Hyperlipidemia     Hypertension     Osteoarthritis      Past Surgical History:   Procedure Laterality Date    COLONOSCOPY N/A 12/10/2016    COLONOSCOPY & BIOPSY performed by Mike Manuel MD at [facility] OFFICE/OUTPT VISIT,PROCEDURE ONLY Right 10/28/2018    HIP HEMIARTHROPLASTY performed by Emmanuelle Martinez MD at [place] [address]  2/4/2016          Restrictions:  Restrictions/Precautions: Weight Bearing, Fall Risk  Lower Extremity Weight Bearing Restrictions  Right Lower Extremity Weight Bearing: Weight Bearing As Tolerated  Position Activity Restriction  Hip Precautions: Posterior hip precautions  Other position/activity restrictions: severe dementia     SUBJECTIVE:  Subjective  Subjective: Pt talking to herself upon arrival.   General Comment  Comments: POD #1 R TFN; Post Hip prec. Pre Pain Assessment:  Pre Treatment Pain Screening  Intervention List: Patient able to continue with treatment  Comments / Details: Pt unable to state whether she has pain or not. Grimaces with R leg movement          Post Pain Assessment:           OBJECTIVE:         Bed mobility  Supine to Sit: Maximum assistance  Sit to Supine: Dependent/Total  Scooting: Dependent/Total  Comment: pt unable to follow instruction; hand over hand assist for initiaiton of all movement; repeated simple commands    Transfers  Sit to Stand: Moderate Assistance;Maximum Assistance (+2)  Stand to sit: Moderate Assistance  Bed to Chair: Dependent/Total  Comment: With bed elevated at [employer] pt able to stand up with Moderate assist; From chair with face to face, pt was unable to follow instruction and was a Max assist of 2 to get safely to the bed    Ambulation 1  Device: [address]: Maximum assistance  Quality of Gait: small steps to chair, unable to follow simple commands  Distance: 2-3 steps                                ASSESSMENT:  Body structures, Functions, Activity limitations: Decreased functional mobility ; Decreased ROM; Decreased strength;Decreased fine motor control;Decreased

## 2018-10-29 NOTE — PROGRESS NOTES
Reviewed: Yes  Behavior/Cognition: Alert;Confused; Cooperative; Requires cueing  Communication Observation: Functional  Follows Directions:  (Follows simple directions with cues approx 50%x.)  Dentition:  (lower dentition only)  Patient Positioning: Upright in bed  Baseline Vocal Quality: Hoarse  Prior Dysphagia History: Unknown  Consistencies Administered: Puree; Thin - straw; Thin - cup; Ice Chips; Nectar - teaspoon;Nectar - cup        Current Diet level:  Current Diet :  (Dental Soft)  Current Liquid Diet : Thin    Oral Motor Deficits  Oral/Motor  Oral Motor: Exceptions to [facility] (unable to follow all directions/models to complete.)  Labial Coordination: Reduced  Lingual Coordination: Reduced    Oral Phase Dysfunction  Oral Phase  Oral Phase: Exceptions  Oral Phase Dysfunction  Decreased Anterior to Posterior Transit: All  Suspected Premature Bolus Loss: Thin  Oral Phase  Oral Phase - Comment: Moderate-severe oral dysphagia characterized by impaired coordination of initiating and accepting bolus due to impaired cognition. Pt demonstrated severe oral holding (up to 90 seconds) of all consistencies tested and would often talk while keeping puree or liquid in back of mouth. Frequent verbal cues needed. No oral residue following swallow. Indicators of Pharyngeal Phase Dysfunction   Pharyngeal Phase  Pharyngeal Phase: Exceptions  Indicators of Pharyngeal Phase Dysfunction  Delayed Swallow: All  Throat Clearing - Delayed: Thin - straw;Nectar - cup  Cough - Delayed: Thin - straw  Pharyngeal Phase   Pharyngeal: Suspect moderate pharyngeal dysphagia characterized by severe swallow delay, decreased laryngeal elevation and coordination of the swallow which led to suspected premature entry of thin, delayed throat clearing and coughing following thin from straw and nectar from cup. Trials of nectar from spoon greatly reduced swallow onset time, averaging 8-10 seconds.       Impression  Dysphagia Diagnosis: Severe oral stage

## 2018-10-29 NOTE — PROGRESS NOTES
[facility] OCCUPATIONAL THERAPY EVALUATION - ACUTE   Room: N227/N227-01  Date: 10/29/2018  Patient Name: Kelly Nieto        MRN: [id number]  Account: [de-identified]   : 1945  (72 y.o.)    Chart Review:  Diagnosis:  The primary encounter diagnosis was Closed fracture of right hip, initial encounter ([facility] [address] A diagnosis of Fall, initial encounter was also pertinent to this visit.   Past Medical History:   Diagnosis Date    Anxiety     Chronic back pain     Dementia     Depression     GERD (gastroesophageal reflux disease)     Hyperlipidemia     Hypertension     Osteoarthritis      Past Surgical History:   Procedure Laterality Date    COLONOSCOPY N/A 12/10/2016    COLONOSCOPY & BIOPSY performed by Mike Manuel MD at [address]      TOTAL KNEE ARTHROPLASTY  2016       Restrictions/Precautions: Weight Bearing, Fall Risk  Hip Precautions: Posterior hip precautions  Other position/activity restrictions: severe dementia   Right Lower Extremity Weight Bearing: Weight Bearing As Tolerated    Evaluation and Pt. rights have been reviewed: [x]Yes   [] No   If no why not:   Falls safety interventions in place  []Yes   [] No    Comments:     Subjective: \"I don't know what to do Bill\"    Prior living arrangement:    Support contact: caregiver, son out of state  Pt lives: [] Alone   [] With spouse   [x] Other   Comment:  Caregiver  Pt unable to recall home set up      Previous Functional Status:  Assist from caregiver    Pain:   Start of session: not rated/10  Location: R TKR  Description: hurts  Action: [] No Action Necessary    [x] Patient reports pain at acceptable level for treatment  [] Nursing notified    [] Other      Objective:  Observation:  Alert, cooperative    Orientation: Oriented to  [x] Person   [x] Place  [x]Time    Vision:   [x]  [facility]   [] Impaired  Comments:      Hearing:  [x] WFL   [] Impaired  Comments:    Sensation:   [x] [place]   []  Impaired   Comments: to [facility] in order to participate in self-care activities as projected. [x]  Access appropriate D/C site with as few architectural barriers as possible. Treatment Plan will consist of:   [x] ADL Training   [x] Strengthening   [x] Endurance   [x] Transfer Training   [x]  DME ed      [x] HEP  [] Manual Therapy   [] AROM/PROM    [] Coordination   [x] Cognitive Training   [x]Safety training   [] Other :    Patient Goal: none stated  Discussed and agreed upon: [x] Yes   [] No Comment:     Assessment/Discharge Disposition:     Performance deficits / Impairments: Decreased functional mobility , Decreased ADL status, Decreased strength, Decreased safe awareness, Decreased cognition, Decreased endurance, Decreased balance, Decreased fine motor control, Decreased high-level IADLs, Decreased coordination  Prognosis: Fair  Discharge Recommendations: Continue to assess pending progress  History: multi comorb  Exam: 10 deficits  Assistance / Modification: Max A/Dep      Barriers to Improvement:  Dementia, and TH precautions      Discharge Recommendations:  D/C with therapy. (S)    Six Click Score  How much help for putting on and taking off regular lower body clothing?: Total  How much help for Bathing?: Total  How much help for Toileting?: Total  How much help for putting on and taking off regular upper body clothing?: A Lot  How much help for taking care of personal grooming?: A Lot  How much help for eating meals?: A Little  AM-[facility] Inpatient Daily Activity Raw Score: 10  AM-PAC Inpatient ADL T-Scale Score : 27.31  ADL Inpatient CMS 0-100% Score: 74.7    Recommended DME:  [x] W/W   [] Alcria    [] Rollator   [] W/C   [x] Shabbir Bradshaw  [x] Shower Chair   [x]Dressing AD []  BSC  [] Other:    Plan:Times per week: 1-3x,      G-Codes:  OT G-codes  Functional Limitation: Self care  Self Care Current Status (): At least 60 percent but less than 80 percent impaired, limited or restricted  Self Care Goal Status ():  At least 20 percent but less than 40 percent impaired, limited or restricted    Time in:  10:00  Time out:  10:25  Total minutes:  25  Timed treatment minutes:  10  Tx:  Reorientation of status and step by step sequencing for functional mobility and safety to chair and back to bed      Electronically signed by:    AARON Mcmillan  10/29/2018, 10:39 AM

## 2018-10-29 NOTE — PROGRESS NOTES
Exercise Program, Modalities, Stair training, Safety Education & Training  Safety Devices  Type of devices: Call light within reach, Bed alarm in place, Nurse notified    G-Code:  PT G-Codes  Functional Assessment Tool Used: clinical judgement   Functional Limitation: Mobility: Walking and moving around  Mobility: Walking and Moving Around Current Status (): At least 60 percent but less than 80 percent impaired, limited or restricted  Mobility: Walking and Moving Around Goal Status ():  At least 20 percent but less than 40 percent impaired, limited or restricted    Goals:  Patient goals : unable to state goals  Short term goals  Short term goal 1: Patient will be SBA HEP  Short term goal 2: Patient will complete bed mobility min A while maintaining hip precautions RLE  Short term goal 3: Patient will sit<>stand min A while maintaining hip precautions RLE  Short term goal 4: Patient will ambulate >25 feet with fww min A while maintaining hip precautions RLE    AMPAC (6 CLICK) BASIC MOBILITY  AM-PAC Inpatient Mobility Raw Score : 11     Therapy Time:   Individual   Time In 0952   Time Out 1019   Minutes [address], PT, 10/29/18 at 10:30 AM

## 2018-10-30 ENCOUNTER — APPOINTMENT (OUTPATIENT)
Dept: GENERAL RADIOLOGY | Age: 73
DRG: 470 | End: 2018-10-30
Payer: MEDICARE

## 2018-10-30 LAB
ANION GAP SERPL CALCULATED.3IONS-SCNC: 8 MEQ/L (ref 7–13)
BUN BLDV-MCNC: 15 MG/DL (ref 8–23)
CALCIUM SERPL-MCNC: 7.8 MG/DL (ref 8.6–10.2)
CHLORIDE BLD-SCNC: 108 MEQ/L (ref 98–107)
CO2: 27 MEQ/L (ref 22–29)
CREAT SERPL-MCNC: 0.65 MG/DL (ref 0.5–0.9)
GFR AFRICAN AMERICAN: >60
GFR NON-AFRICAN AMERICAN: >60
GLUCOSE BLD-MCNC: 91 MG/DL (ref 74–109)
HCT VFR BLD CALC: 28.5 % (ref 37–47)
HEMOGLOBIN: 9.4 G/DL (ref 12–16)
MCH RBC QN AUTO: 31.7 PG (ref 27–31.3)
MCHC RBC AUTO-ENTMCNC: 33 % (ref 33–37)
MCV RBC AUTO: 96.1 FL (ref 82–100)
ORGANISM: ABNORMAL
ORGANISM: ABNORMAL
PDW BLD-RTO: 17.5 % (ref 11.5–14.5)
PLATELET # BLD: 228 K/UL (ref 130–400)
POTASSIUM SERPL-SCNC: 3.7 MEQ/L (ref 3.5–5.1)
RBC # BLD: 2.97 M/UL (ref 4.2–5.4)
SODIUM BLD-SCNC: 143 MEQ/L (ref 132–144)
URINE CULTURE, ROUTINE: ABNORMAL
WBC # BLD: 12.6 K/UL (ref 4.8–10.8)

## 2018-10-30 PROCEDURE — 6360000002 HC RX W HCPCS: Performed by: NURSE PRACTITIONER

## 2018-10-30 PROCEDURE — 80048 BASIC METABOLIC PNL TOTAL CA: CPT

## 2018-10-30 PROCEDURE — 2580000003 HC RX 258: Performed by: INTERNAL MEDICINE

## 2018-10-30 PROCEDURE — 2580000003 HC RX 258: Performed by: NURSE PRACTITIONER

## 2018-10-30 PROCEDURE — 6360000002 HC RX W HCPCS: Performed by: ORTHOPAEDIC SURGERY

## 2018-10-30 PROCEDURE — 6370000000 HC RX 637 (ALT 250 FOR IP): Performed by: NURSE PRACTITIONER

## 2018-10-30 PROCEDURE — 97535 SELF CARE MNGMENT TRAINING: CPT

## 2018-10-30 PROCEDURE — 1210000000 HC MED SURG R&B

## 2018-10-30 PROCEDURE — 71046 X-RAY EXAM CHEST 2 VIEWS: CPT

## 2018-10-30 PROCEDURE — 6360000002 HC RX W HCPCS: Performed by: INTERNAL MEDICINE

## 2018-10-30 PROCEDURE — 36415 COLL VENOUS BLD VENIPUNCTURE: CPT

## 2018-10-30 PROCEDURE — 6370000000 HC RX 637 (ALT 250 FOR IP): Performed by: ORTHOPAEDIC SURGERY

## 2018-10-30 PROCEDURE — 97116 GAIT TRAINING THERAPY: CPT

## 2018-10-30 PROCEDURE — 85027 COMPLETE CBC AUTOMATED: CPT

## 2018-10-30 PROCEDURE — 2580000003 HC RX 258: Performed by: ORTHOPAEDIC SURGERY

## 2018-10-30 PROCEDURE — 92526 ORAL FUNCTION THERAPY: CPT

## 2018-10-30 PROCEDURE — 36430 TRANSFUSION BLD/BLD COMPNT: CPT

## 2018-10-30 RX ORDER — AMLODIPINE BESYLATE 5 MG/1
5 TABLET ORAL DAILY
Status: DISCONTINUED | OUTPATIENT
Start: 2018-10-31 | End: 2018-10-31

## 2018-10-30 RX ORDER — SENNA AND DOCUSATE SODIUM 50; 8.6 MG/1; MG/1
1 TABLET, FILM COATED ORAL 2 TIMES DAILY
Qty: 60 TABLET | Refills: 0
Start: 2018-10-30 | End: 2018-11-29

## 2018-10-30 RX ORDER — SODIUM CHLORIDE, SODIUM LACTATE, POTASSIUM CHLORIDE, CALCIUM CHLORIDE 600; 310; 30; 20 MG/100ML; MG/100ML; MG/100ML; MG/100ML
INJECTION, SOLUTION INTRAVENOUS CONTINUOUS
Status: DISCONTINUED | OUTPATIENT
Start: 2018-10-30 | End: 2018-10-31 | Stop reason: HOSPADM

## 2018-10-30 RX ORDER — OXYCODONE HYDROCHLORIDE 5 MG/1
5 TABLET ORAL EVERY 6 HOURS PRN
Qty: 20 TABLET | Refills: 0 | Status: SHIPPED | OUTPATIENT
Start: 2018-10-30 | End: 2018-11-06

## 2018-10-30 RX ORDER — TRAMADOL HYDROCHLORIDE 50 MG/1
50 TABLET ORAL EVERY 6 HOURS PRN
Qty: 28 TABLET | Refills: 0 | Status: SHIPPED | OUTPATIENT
Start: 2018-10-30 | End: 2018-11-06

## 2018-10-30 RX ADMIN — DIVALPROEX SODIUM 125 MG: 125 CAPSULE, COATED PELLETS ORAL at 13:59

## 2018-10-30 RX ADMIN — DIVALPROEX SODIUM 125 MG: 125 CAPSULE, COATED PELLETS ORAL at 08:37

## 2018-10-30 RX ADMIN — ACETAMINOPHEN 500 MG: 500 TABLET ORAL at 21:29

## 2018-10-30 RX ADMIN — DOCUSATE SODIUM 100 MG: 100 CAPSULE, LIQUID FILLED ORAL at 21:29

## 2018-10-30 RX ADMIN — PANTOPRAZOLE SODIUM 40 MG: 40 TABLET, DELAYED RELEASE ORAL at 05:33

## 2018-10-30 RX ADMIN — PIPERACILLIN SODIUM,TAZOBACTAM SODIUM 3.38 G: 3; .375 INJECTION, POWDER, FOR SOLUTION INTRAVENOUS at 22:34

## 2018-10-30 RX ADMIN — AMLODIPINE BESYLATE 10 MG: 10 TABLET ORAL at 08:37

## 2018-10-30 RX ADMIN — MEMANTINE 10 MG: 10 TABLET ORAL at 08:37

## 2018-10-30 RX ADMIN — DIVALPROEX SODIUM 125 MG: 125 CAPSULE, COATED PELLETS ORAL at 21:29

## 2018-10-30 RX ADMIN — OXYCODONE HYDROCHLORIDE 5 MG: 5 TABLET ORAL at 17:56

## 2018-10-30 RX ADMIN — Medication 10 ML: at 08:38

## 2018-10-30 RX ADMIN — SODIUM CHLORIDE, POTASSIUM CHLORIDE, SODIUM LACTATE AND CALCIUM CHLORIDE: 600; 310; 30; 20 INJECTION, SOLUTION INTRAVENOUS at 10:51

## 2018-10-30 RX ADMIN — DOCUSATE SODIUM 100 MG: 100 CAPSULE, LIQUID FILLED ORAL at 08:37

## 2018-10-30 RX ADMIN — ACETAMINOPHEN 500 MG: 500 TABLET ORAL at 08:36

## 2018-10-30 RX ADMIN — ACETAMINOPHEN 500 MG: 500 TABLET ORAL at 13:58

## 2018-10-30 RX ADMIN — MEMANTINE 10 MG: 10 TABLET ORAL at 21:29

## 2018-10-30 RX ADMIN — DONEPEZIL HYDROCHLORIDE 5 MG: 10 TABLET, FILM COATED ORAL at 21:29

## 2018-10-30 RX ADMIN — ATORVASTATIN CALCIUM 10 MG: 10 TABLET, FILM COATED ORAL at 08:37

## 2018-10-30 RX ADMIN — PIPERACILLIN SODIUM,TAZOBACTAM SODIUM 3.38 G: 3; .375 INJECTION, POWDER, FOR SOLUTION INTRAVENOUS at 15:03

## 2018-10-30 RX ADMIN — CEFTRIAXONE SODIUM 1 G: 1 INJECTION, POWDER, FOR SOLUTION INTRAMUSCULAR; INTRAVENOUS at 10:50

## 2018-10-30 RX ADMIN — RANOLAZINE 500 MG: 500 TABLET, FILM COATED, EXTENDED RELEASE ORAL at 21:29

## 2018-10-30 RX ADMIN — MELATONIN TAB 3 MG 3 MG: 3 TAB at 21:29

## 2018-10-30 RX ADMIN — OXYCODONE HYDROCHLORIDE 5 MG: 5 TABLET ORAL at 10:54

## 2018-10-30 RX ADMIN — ACETAMINOPHEN 500 MG: 500 TABLET ORAL at 17:56

## 2018-10-30 RX ADMIN — DONEPEZIL HYDROCHLORIDE 5 MG: 10 TABLET, FILM COATED ORAL at 08:37

## 2018-10-30 RX ADMIN — METOPROLOL SUCCINATE 50 MG: 50 TABLET, EXTENDED RELEASE ORAL at 08:37

## 2018-10-30 RX ADMIN — DOCUSATE SODIUM AND SENNOSIDES 1 TABLET: 50; 8.6 TABLET, FILM COATED ORAL at 21:29

## 2018-10-30 RX ADMIN — ACETAMINOPHEN 500 MG: 500 TABLET ORAL at 00:51

## 2018-10-30 RX ADMIN — RANOLAZINE 500 MG: 500 TABLET, FILM COATED, EXTENDED RELEASE ORAL at 08:37

## 2018-10-30 RX ADMIN — ACETAMINOPHEN 500 MG: 500 TABLET ORAL at 05:33

## 2018-10-30 RX ADMIN — ENOXAPARIN SODIUM 40 MG: 40 INJECTION SUBCUTANEOUS at 08:36

## 2018-10-30 ASSESSMENT — PAIN SCALES - GENERAL
PAINLEVEL_OUTOF10: 5
PAINLEVEL_OUTOF10: 2
PAINLEVEL_OUTOF10: 4
PAINLEVEL_OUTOF10: 3
PAINLEVEL_OUTOF10: 2
PAINLEVEL_OUTOF10: 2
PAINLEVEL_OUTOF10: 0
PAINLEVEL_OUTOF10: 7
PAINLEVEL_OUTOF10: 4

## 2018-10-30 ASSESSMENT — PAIN DESCRIPTION - LOCATION: LOCATION: HIP

## 2018-10-30 ASSESSMENT — PAIN DESCRIPTION - ORIENTATION: ORIENTATION: RIGHT

## 2018-10-30 ASSESSMENT — PAIN DESCRIPTION - DESCRIPTORS: DESCRIPTORS: ACHING

## 2018-10-30 ASSESSMENT — PAIN DESCRIPTION - PAIN TYPE: TYPE: SURGICAL PAIN

## 2018-10-30 NOTE — PROGRESS NOTES
[facility]  Speech Language/Pathology  Dysphagia Therapy Note    Alicia Barlow  10/30/2018    Medical Dx: Closed right hip fracture, initial encounter ([facility] [address] Marcell Montoya    Time in: 1422  Time out: 1434    Pt being seen for : [] Dysphagia exercises  [] Oral Motor Exercises             [x] Therapeutic meal monitor  [] Other:    Subjective:  Behavior: [] Alert  [x] Cooperative  [x]  Pleasant  [x] Confused  [] Agitated      [] Uncooperative  [] Distractible [] Motivated  [] Self-Limiting [] Anxious         [] Other:    Endurance:  [x] Adequate for participation in SLP sessions  [] Reduced overall              [] Lethargic  [] Other:    Objective/Assessment:     Per pt's RN, pt still requiring frequent and consistent cues to initiate swallow for all consistencies. Pt also not consuming much food/drink. Goal 1: Pt will tolerate puree diet with nectar thick liquids with no overt s/s of aspiration. Pt tolerated bites of puree via spoon without overt s/s of aspiration in all opportunities. Pt required cueing to initiate pharyngeal swallow in 3/4 opportunities. Pt also required frequent cueing to not start talking prior to swallowing. Pt tolerated sips of nectar thick liquid via spoon and via cup without overt s/s of aspiration in all opportunities. Pt required cueing to initiate pharyngeal swallow with nectar thick liquid via spoon in 3/3 opportunities. Pt did not require cueing to initiate pharyngeal swallow with nectar thick liquid via cup in all opportunities. Pt allowed limited trials of all consistencies. Goal 2: Pt will tolerate therapeutic trials of mechanical soft/thin liquids with no significant oral holding and no overt s/s of aspiration. Not appropriate at this time. Rec: Continue with current diet of puree (dysphagia I)/nectar thick liquid; a dietary consult is also recommended d/t poor PO intake. Discussed with ISMAEL Jarvis.       [x] Pt demonstrated no s/s of pain during this visit.  none  N/A  [] Nursing notified    Safety Devices:  [x] Call light within reach [x] avysis in room        [x] Bed alarm activated    Plan:  [x] Continue as per plan of care  [] Prepare for Discharge   [] Discharge      Patient/Caregiver Education:  Treatment goals discussed with [x]  Patient  []  family. The []  Patient  []  family understand the diagnosis, prognosis and plan of care.     Signature: Electronically signed by WILLAM Kingston on 10/30/2018 at 2:55 PM

## 2018-10-30 NOTE — PROGRESS NOTES
Physical Therapy Med Surg Daily Treatment Note  Facility/Department: [facility]  Room: N227/N227-01       NAME: Emilia Padilla  : 1945 (89 y.o.)  MRN: [id number]  CODE STATUS: Full Code    Date of Service: 10/30/2018    Patient Diagnosis(es): Closed right hip fracture, initial encounter [facility]) [facility]   Chief Complaint   Patient presents with    Fall     Pt fell today, has right upper thigh pain.      Patient Active Problem List    Diagnosis Date Noted    Closed fracture of right hip ([facility] [address]      Priority: High    Coronary atherosclerosis 10/29/2018    Dyspnea 10/29/2018    Primary cardiomyopathy ([facility] [address] 10/29/2018    Preop cardiovascular exam     Closed right hip fracture, [facility] encounter ([facility] 10/27/2018    Acute cystitis 2018    Dementia with behavioral disturbance 2018    Altered mental state 2018    Difficulty walking 2018    Late onset Alzheimer's disease with behavioral disturbance 2017    Lactic acid acidosis 2016    Anemia due to GI blood loss 2016    Hypokalemia 2016    Intractable abdominal pain 2016    Dementia     Dementia associated with alcoholism ([facility] [address] 2015    Osteoarthritis     Chronic back pain     GERD (gastroesophageal reflux disease)     Hyperlipidemia     Hypertension     Depression     Anxiety     Osteoarthritis of spine with radiculopathy, lumbar region 10/15/2015    Excess weight 10/15/2015    Nicotine dependence, uncomplicated     Primary osteoarthritis of left knee 10/15/2015    Scoliosis 10/15/2015    Lumbar spondylosis 2015    Facet syndrome, lumbar 2015    OA (osteoarthritis) of knee 2015        Past Medical History:   Diagnosis Date    Anxiety     Chronic back pain     Dementia     Depression     GERD (gastroesophageal reflux disease)     Hyperlipidemia     Hypertension     Osteoarthritis      Past Surgical History:   Procedure Laterality Date   

## 2018-10-31 VITALS
HEART RATE: 67 BPM | TEMPERATURE: 98.8 F | DIASTOLIC BLOOD PRESSURE: 66 MMHG | SYSTOLIC BLOOD PRESSURE: 110 MMHG | RESPIRATION RATE: 18 BRPM | OXYGEN SATURATION: 95 % | WEIGHT: 169.75 LBS | BODY MASS INDEX: 27.28 KG/M2 | HEIGHT: 66 IN

## 2018-10-31 LAB
BLOOD BANK DISPENSE STATUS: NORMAL
BLOOD BANK DISPENSE STATUS: NORMAL
BLOOD BANK PRODUCT CODE: NORMAL
BLOOD BANK PRODUCT CODE: NORMAL
BPU ID: NORMAL
BPU ID: NORMAL
DESCRIPTION BLOOD BANK: NORMAL
DESCRIPTION BLOOD BANK: NORMAL
HCT VFR BLD CALC: 29.2 % (ref 37–47)
HEMOGLOBIN: 9.8 G/DL (ref 12–16)
MCH RBC QN AUTO: 32 PG (ref 27–31.3)
MCHC RBC AUTO-ENTMCNC: 33.7 % (ref 33–37)
MCV RBC AUTO: 94.9 FL (ref 82–100)
PDW BLD-RTO: 17.4 % (ref 11.5–14.5)
PLATELET # BLD: 264 K/UL (ref 130–400)
RBC # BLD: 3.07 M/UL (ref 4.2–5.4)
WBC # BLD: 11.2 K/UL (ref 4.8–10.8)

## 2018-10-31 PROCEDURE — 6370000000 HC RX 637 (ALT 250 FOR IP): Performed by: NURSE PRACTITIONER

## 2018-10-31 PROCEDURE — 6360000002 HC RX W HCPCS: Performed by: NURSE PRACTITIONER

## 2018-10-31 PROCEDURE — 97530 THERAPEUTIC ACTIVITIES: CPT

## 2018-10-31 PROCEDURE — 97535 SELF CARE MNGMENT TRAINING: CPT

## 2018-10-31 PROCEDURE — 36415 COLL VENOUS BLD VENIPUNCTURE: CPT

## 2018-10-31 PROCEDURE — 6370000000 HC RX 637 (ALT 250 FOR IP): Performed by: INTERNAL MEDICINE

## 2018-10-31 PROCEDURE — 85027 COMPLETE CBC AUTOMATED: CPT

## 2018-10-31 PROCEDURE — 6370000000 HC RX 637 (ALT 250 FOR IP): Performed by: ORTHOPAEDIC SURGERY

## 2018-10-31 PROCEDURE — 97116 GAIT TRAINING THERAPY: CPT

## 2018-10-31 PROCEDURE — APPNB15 APP NON BILLABLE TIME 0-15 MINS: Performed by: NURSE PRACTITIONER

## 2018-10-31 PROCEDURE — 2580000003 HC RX 258: Performed by: NURSE PRACTITIONER

## 2018-10-31 PROCEDURE — 99231 SBSQ HOSP IP/OBS SF/LOW 25: CPT | Performed by: INTERNAL MEDICINE

## 2018-10-31 RX ORDER — PANTOPRAZOLE SODIUM 40 MG/1
40 TABLET, DELAYED RELEASE ORAL
Qty: 30 TABLET | Refills: 3 | DISCHARGE
Start: 2018-11-01

## 2018-10-31 RX ORDER — AMLODIPINE BESYLATE 2.5 MG/1
2.5 TABLET ORAL DAILY
Status: DISCONTINUED | OUTPATIENT
Start: 2018-10-31 | End: 2018-10-31 | Stop reason: HOSPADM

## 2018-10-31 RX ORDER — POTASSIUM CHLORIDE 750 MG/1
15 TABLET, EXTENDED RELEASE ORAL DAILY
Qty: 60 TABLET | Refills: 3 | DISCHARGE
Start: 2018-10-31

## 2018-10-31 RX ORDER — ALBUTEROL SULFATE 2.5 MG/3ML
2.5 SOLUTION RESPIRATORY (INHALATION) EVERY 6 HOURS PRN
Qty: 120 EACH | Refills: 3 | DISCHARGE
Start: 2018-10-31

## 2018-10-31 RX ORDER — AMLODIPINE BESYLATE 2.5 MG/1
2.5 TABLET ORAL DAILY
Qty: 30 TABLET | Refills: 3 | DISCHARGE
Start: 2018-10-31

## 2018-10-31 RX ORDER — PIPERACILLIN SODIUM, TAZOBACTAM SODIUM 3; .375 G/15ML; G/15ML
3.38 INJECTION, POWDER, LYOPHILIZED, FOR SOLUTION INTRAVENOUS EVERY 8 HOURS
DISCHARGE
Start: 2018-10-31 | End: 2018-11-07

## 2018-10-31 RX ORDER — METOPROLOL SUCCINATE 25 MG/1
25 TABLET, EXTENDED RELEASE ORAL DAILY
Qty: 30 TABLET | Refills: 3
Start: 2018-11-01

## 2018-10-31 RX ORDER — METOPROLOL SUCCINATE 25 MG/1
25 TABLET, EXTENDED RELEASE ORAL DAILY
Status: DISCONTINUED | OUTPATIENT
Start: 2018-11-01 | End: 2018-10-31 | Stop reason: HOSPADM

## 2018-10-31 RX ADMIN — DIVALPROEX SODIUM 125 MG: 125 CAPSULE, COATED PELLETS ORAL at 10:52

## 2018-10-31 RX ADMIN — DOCUSATE SODIUM AND SENNOSIDES 1 TABLET: 50; 8.6 TABLET, FILM COATED ORAL at 14:31

## 2018-10-31 RX ADMIN — DONEPEZIL HYDROCHLORIDE 5 MG: 10 TABLET, FILM COATED ORAL at 10:51

## 2018-10-31 RX ADMIN — DIVALPROEX SODIUM 125 MG: 125 CAPSULE, COATED PELLETS ORAL at 14:28

## 2018-10-31 RX ADMIN — PIPERACILLIN SODIUM,TAZOBACTAM SODIUM 3.38 G: 3; .375 INJECTION, POWDER, FOR SOLUTION INTRAVENOUS at 05:26

## 2018-10-31 RX ADMIN — DOCUSATE SODIUM 100 MG: 100 CAPSULE, LIQUID FILLED ORAL at 10:51

## 2018-10-31 RX ADMIN — PIPERACILLIN SODIUM,TAZOBACTAM SODIUM 3.38 G: 3; .375 INJECTION, POWDER, FOR SOLUTION INTRAVENOUS at 14:13

## 2018-10-31 RX ADMIN — METOPROLOL SUCCINATE 50 MG: 50 TABLET, EXTENDED RELEASE ORAL at 10:51

## 2018-10-31 RX ADMIN — OXYCODONE HYDROCHLORIDE 5 MG: 5 TABLET ORAL at 01:58

## 2018-10-31 RX ADMIN — ACETAMINOPHEN 500 MG: 500 TABLET ORAL at 14:26

## 2018-10-31 RX ADMIN — AMLODIPINE BESYLATE 2.5 MG: 2.5 TABLET ORAL at 10:51

## 2018-10-31 RX ADMIN — MEMANTINE 10 MG: 10 TABLET ORAL at 10:51

## 2018-10-31 RX ADMIN — ACETAMINOPHEN 500 MG: 500 TABLET ORAL at 05:26

## 2018-10-31 RX ADMIN — ACETAMINOPHEN 500 MG: 500 TABLET ORAL at 10:51

## 2018-10-31 RX ADMIN — RANOLAZINE 500 MG: 500 TABLET, FILM COATED, EXTENDED RELEASE ORAL at 14:31

## 2018-10-31 RX ADMIN — OXYCODONE HYDROCHLORIDE 5 MG: 5 TABLET ORAL at 10:49

## 2018-10-31 RX ADMIN — PANTOPRAZOLE SODIUM 40 MG: 40 TABLET, DELAYED RELEASE ORAL at 05:26

## 2018-10-31 ASSESSMENT — PAIN SCALES - GENERAL
PAINLEVEL_OUTOF10: 8
PAINLEVEL_OUTOF10: 6
PAINLEVEL_OUTOF10: 2
PAINLEVEL_OUTOF10: 2
PAINLEVEL_OUTOF10: 3
PAINLEVEL_OUTOF10: 10

## 2018-10-31 ASSESSMENT — PAIN DESCRIPTION - DESCRIPTORS
DESCRIPTORS: ACHING
DESCRIPTORS: ACHING

## 2018-10-31 ASSESSMENT — PAIN DESCRIPTION - LOCATION
LOCATION: HIP
LOCATION: HIP

## 2018-10-31 ASSESSMENT — PAIN DESCRIPTION - PAIN TYPE: TYPE: SURGICAL PAIN

## 2018-10-31 ASSESSMENT — PAIN DESCRIPTION - ORIENTATION
ORIENTATION: RIGHT
ORIENTATION: RIGHT

## 2018-10-31 NOTE — PROGRESS NOTES
Progress Note  Patient: Rob Ortiz  Unit/Bed: C201/O248-87  YOB: 1945  MRN: [id number]  Acct: [de-identified]   Admitting Diagnosis: Closed right hip fracture, initial encounter [facility]) Emilie Umaña Date:  10/27/2018  Hospital Day: 4    Chief Complaint:  Right hip repair s/p fall    Subjective  72-year-old female lives at home with a caretaker had fallen and fractured her right hip they kindly asked cardiology get involved with her case for preop clearance. 2 years ago we did do an echo her ejection fraction was 60% at that time and she also has a history of a left bundle-branch block. The patient is awake but confused she denies having any chest pain or shortness of breath at this time. a history of dementia, left bundle branch block, dyslipidemia, hypertension.     10/29/18  Patient awake and confused  Vital signs stable  Tele ordered    1031/18  Patient awake and confused  Vital signs stable  Tele LBBB no ectopy    Review of Systems:   Review of Systems   Unable to perform ROS: Dementia         Physical Examination:    /66   Pulse 67   Temp 98.8 °F (37.1 °C) (Oral)   Resp 18   Ht 5' 6\" (1.676 m)   Wt 169 lb 12.1 oz (77 kg)   SpO2 95%   BMI 27.40 kg/m²    Physical Exam   Neck: No JVD present. Cardiovascular: Normal rate, regular rhythm and intact distal pulses. Murmur heard. Pulmonary/Chest: Effort normal and breath sounds normal.   Neurological: She is alert. Confused     Skin: Skin is warm and dry.        LABS:  CBC:   Lab Results   Component Value Date    WBC 11.2 10/31/2018    RBC 3.07 10/31/2018    HGB 9.8 10/31/2018    HCT 29.2 10/31/2018    MCV 94.9 10/31/2018    MCH 32.0 10/31/2018    MCHC 33.7 10/31/2018    RDW 17.4 10/31/2018     10/31/2018    MPV 7.7 02/23/2015     CBC with Differential:   Lab Results   Component Value Date    WBC 11.2 10/31/2018    RBC 3.07 10/31/2018    HGB 9.8 10/31/2018    HCT 29.2 10/31/2018     10/31/2018    MCV 94.9 10/31/2018 Priority: High    Preop cardiovascular exam [U59.889]      Priority: Low    Closed right hip fracture, initial encounter ([facility] [address] [S72.001A] 10/27/2018     Priority: Low    [facility] [F10.27] 11/13/2015     Priority: Low    Osteoarthritis [M19.90]      Priority: Low    Hypertension [I10]      Priority: Low    Hyperlipidemia [E78.5]      Priority: Low    GERD (gastroesophageal reflux disease) [K21.9]      Priority: Low    Scoliosis [M41.9] 10/15/2015     Priority: Low    Primary osteoarthritis of left knee [M17.12] 10/15/2015     Priority: Low    Osteoarthritis of spine with radiculopathy, lumbar region [M47.26] 10/15/2015     Priority: Low    Lumbar spondylosis [M47.816] 08/04/2015     Priority: Low        I reviewed and agree with the findings and plan documented in his note . Impression/Plan     1. POst op right hip- stable  2. HTN stabel  3.  OK for Rehab

## 2018-10-31 NOTE — CARE COORDINATION
[facility] has no beds available for Pt. LSW met with son and his wife Sedrick Mcmanus and explained. They are now agreeable for Pt to go to [facility]. Triny from [facility] will review. Then the son asked to look at [facility] in [place]. ARTHURW was [phone], LSW left message. Nadir Lazaro called [place] at [facility] she stated that  It is not guaranteed that MEDICARE will cover the ride to [place] but it should be covered. [facility] said they might have a co pay of $100. Ivis Keating Electronically signed by BENJI Valle on 10/31/2018 at 11:12 AM
